# Patient Record
Sex: FEMALE | Race: OTHER | Employment: FULL TIME
[De-identification: names, ages, dates, MRNs, and addresses within clinical notes are randomized per-mention and may not be internally consistent; named-entity substitution may affect disease eponyms.]

---

## 2022-09-14 ENCOUNTER — OFFICE VISIT (OUTPATIENT)
Dept: INTERNAL MEDICINE CLINIC | Facility: CLINIC | Age: 37
End: 2022-09-14
Payer: COMMERCIAL

## 2022-09-14 VITALS
BODY MASS INDEX: 48.82 KG/M2 | SYSTOLIC BLOOD PRESSURE: 110 MMHG | OXYGEN SATURATION: 98 % | WEIGHT: 293 LBS | HEART RATE: 88 BPM | DIASTOLIC BLOOD PRESSURE: 78 MMHG | HEIGHT: 65 IN | RESPIRATION RATE: 20 BRPM

## 2022-09-14 DIAGNOSIS — Z01.89 ENCOUNTER FOR ROUTINE LABORATORY TESTING: ICD-10-CM

## 2022-09-14 DIAGNOSIS — Z11.9 SCREENING EXAMINATION FOR INFECTIOUS DISEASE: ICD-10-CM

## 2022-09-14 DIAGNOSIS — E66.01 MORBID OBESITY WITH BMI OF 70 AND OVER, ADULT (HCC): ICD-10-CM

## 2022-09-14 DIAGNOSIS — E66.01 MORBID OBESITY WITH BMI OF 70 AND OVER, ADULT (HCC): Primary | ICD-10-CM

## 2022-09-14 PROCEDURE — 99204 OFFICE O/P NEW MOD 45 MIN: CPT | Performed by: INTERNAL MEDICINE

## 2022-09-14 PROCEDURE — G8427 DOCREV CUR MEDS BY ELIG CLIN: HCPCS | Performed by: INTERNAL MEDICINE

## 2022-09-14 PROCEDURE — 1036F TOBACCO NON-USER: CPT | Performed by: INTERNAL MEDICINE

## 2022-09-14 PROCEDURE — G8417 CALC BMI ABV UP PARAM F/U: HCPCS | Performed by: INTERNAL MEDICINE

## 2022-09-14 RX ORDER — PHENTERMINE HYDROCHLORIDE 37.5 MG/1
37.5 CAPSULE ORAL EVERY MORNING
Qty: 30 CAPSULE | Refills: 0 | Status: SHIPPED | OUTPATIENT
Start: 2022-09-14 | End: 2022-10-12 | Stop reason: SDUPTHER

## 2022-09-14 ASSESSMENT — PATIENT HEALTH QUESTIONNAIRE - PHQ9
2. FEELING DOWN, DEPRESSED OR HOPELESS: 0
SUM OF ALL RESPONSES TO PHQ QUESTIONS 1-9: 0
SUM OF ALL RESPONSES TO PHQ9 QUESTIONS 1 & 2: 0
1. LITTLE INTEREST OR PLEASURE IN DOING THINGS: 0

## 2022-09-14 ASSESSMENT — ANXIETY QUESTIONNAIRES
GAD7 TOTAL SCORE: 0
5. BEING SO RESTLESS THAT IT IS HARD TO SIT STILL: 0
1. FEELING NERVOUS, ANXIOUS, OR ON EDGE: 0
3. WORRYING TOO MUCH ABOUT DIFFERENT THINGS: 0
7. FEELING AFRAID AS IF SOMETHING AWFUL MIGHT HAPPEN: 0
2. NOT BEING ABLE TO STOP OR CONTROL WORRYING: 0
4. TROUBLE RELAXING: 0
6. BECOMING EASILY ANNOYED OR IRRITABLE: 0

## 2022-09-14 ASSESSMENT — ENCOUNTER SYMPTOMS
CONSTIPATION: 0
SHORTNESS OF BREATH: 0
CHEST TIGHTNESS: 0
COUGH: 0
ABDOMINAL PAIN: 0
ABDOMINAL DISTENTION: 0

## 2022-09-14 NOTE — PROGRESS NOTES
Chief Complaint   Patient presents with    Establish Care     Weight loss medication. Belle Moreland is a 39 y.o. female who presents today for Establish Care (Weight loss medication. )  She is here to [facility] care, has not been seen by primary care for over 10 years, moved 2 years ago from [place], she lives with her wife, she has 3 kids 13 15and 5years old. 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 \  No significant past medical history but morbid obesity, she was previously following with a weight management program, where she was given medications like metformin, and phentermine, while she was taking the medication she lost more than 60 to 80 pounds in 6 months, she has been trying to walk, but is becoming difficult for her to exercise due to bilateral knee pain, she reports her lowest weight was around 260, and now she is at her highest point at 480, she is not quite ready for bariatric evaluation, and she would like to try medications again. She reports some dietary indiscretions, including pizza, sodas, Dr. Ladi Echevarria, but she believes her diet mainly consist in proteins, and could do better with fruits and vegetables    She has tried multiple diets, including low carb, high fat, high protein diet without significant improvement      Wt Readings from Last 3 Encounters:   09/14/22 (!) 480 lb 3.2 oz (217.8 kg)     Vitals:    09/14/22 1118   BP: 110/78   Site: Left Upper Arm   Position: Sitting   Pulse: 88   Resp: 20   SpO2: 98%   Weight: (!) 480 lb 3.2 oz (217.8 kg)   Height: 5' 4.5\" (1.638 m)        Assessment and plan:  1. Morbid obesity with BMI of 70 and over, adult [facility])  Assessment & Plan:  Discussed with patient that having obesity increases a person's risk of developing many health problems.    Diabetes, High blood pressure, High cholesterol, Heart disease (including heart attacks), Stroke, Sleep apnea (a disorder in which you stop breathing for short periods while asleep), Asthma, Cancer  But even if weight loss is not possible, may reduce risk by:  Become more active - Many types of physical activity can help, including walking. You can start with a few minutes a day and add more as you get stronger and build up your endurance. Improve your diet - It is healthy to have regular meal times, eat smaller portions, and not skip meals. Avoid sweets and processed foods, and instead eat more vegetables and fruits. Discussed with patient   About  Weight  Loss  Recommendations and  That  medications are Used on:   Chronic weight management, as an adjunct to a reduced-calorie diet and increased physical activity, in patients with either an initial body mass index (BMI) of =30 kg/m2 or an initial BMI of =27 kg/m2 and at least one weight-related comorbid condition (eg, hypertension, dyslipidemia, type 2 diabetes)  evaluate response by week 12; if patient has not lost =5% of baseline body weight, discontinue therapy    Orders:  -     CBC with Auto Differential; Future  -     Comprehensive Metabolic Panel; Future  -     Lipid Panel; Future  -     TSH; Future  -     phentermine 37.5 MG capsule; Take 1 capsule by mouth every morning for 30 days. , Disp-30 capsule, R-0Normal  2. Encounter for routine laboratory testing  -     CBC with Auto Differential; Future  -     Comprehensive Metabolic Panel; Future  -     Lipid Panel; Future  -     TSH; Future  -     Hepatitis C Antibody; Future  3. Screening examination for infectious disease  -     Hepatitis C Antibody; Future    Return in about 4 weeks (around 10/12/2022) for obesity. Review of system:    Review of Systems   Constitutional:  Negative for activity change, chills, fatigue and fever. Respiratory:  Negative for cough, chest tightness and shortness of breath (with exertion). Cardiovascular:  Negative for chest pain.    Gastrointestinal:  Negative for abdominal distention, abdominal Component Value Date    WBC 7.1 09/14/2022    HGB 13.4 09/14/2022    HCT 44.8 09/14/2022    MCV 91.6 09/14/2022     09/14/2022             This document was generated with the aid of voice recognition software. . Please be aware that there may be inadvertent transcription errors not identified and corrected by the [employer]

## 2022-09-14 NOTE — ASSESSMENT & PLAN NOTE
Discussed with patient that having obesity increases a person's risk of developing many health problems. Diabetes, High blood pressure, High cholesterol, Heart disease (including heart attacks), Stroke, Sleep apnea (a disorder in which you stop breathing for short periods while asleep), Asthma, Cancer  But even if weight loss is not possible, may reduce risk by:  Become more active - Many types of physical activity can help, including walking. You can start with a few minutes a day and add more as you get stronger and build up your endurance. Improve your diet - It is healthy to have regular meal times, eat smaller portions, and not skip meals. Avoid sweets and processed foods, and instead eat more vegetables and fruits.     Discussed with patient   About  Weight  Loss  Recommendations and  That  medications are Used on:   Chronic weight management, as an adjunct to a reduced-calorie diet and increased physical activity, in patients with either an initial body mass index (BMI) of =30 kg/m2 or an initial BMI of =27 kg/m2 and at least one weight-related comorbid condition (eg, hypertension, dyslipidemia, type 2 diabetes)  evaluate response by week 12; if patient has not lost =5% of baseline body weight, discontinue therapy

## 2022-09-15 LAB
BASOPHILS # BLD: 0.1 K/UL (ref 0–0.2)
BASOPHILS NFR BLD: 1 % (ref 0–2)
DIFFERENTIAL METHOD BLD: ABNORMAL
EOSINOPHIL # BLD: 0.2 K/UL (ref 0–0.8)
EOSINOPHIL NFR BLD: 2 % (ref 0.5–7.8)
ERYTHROCYTE [DISTWIDTH] IN BLOOD BY AUTOMATED COUNT: 13.6 % (ref 11.9–14.6)
HCT VFR BLD AUTO: 44.8 % (ref 35.8–46.3)
HCV AB SER QL: NONREACTIVE
HGB BLD-MCNC: 13.4 G/DL (ref 11.7–15.4)
HIV 1+2 AB+HIV1 P24 AG SERPL QL IA: NONREACTIVE
HIV 1/2 RESULT COMMENT: NORMAL
IMM GRANULOCYTES # BLD AUTO: 0 K/UL (ref 0–0.5)
IMM GRANULOCYTES NFR BLD AUTO: 1 % (ref 0–5)
LYMPHOCYTES # BLD: 2.8 K/UL (ref 0.5–4.6)
LYMPHOCYTES NFR BLD: 39 % (ref 13–44)
MCH RBC QN AUTO: 27.4 PG (ref 26.1–32.9)
MCHC RBC AUTO-ENTMCNC: 29.9 G/DL (ref 31.4–35)
MCV RBC AUTO: 91.6 FL (ref 79.6–97.8)
MONOCYTES # BLD: 0.5 K/UL (ref 0.1–1.3)
MONOCYTES NFR BLD: 8 % (ref 4–12)
NEUTS SEG # BLD: 3.5 K/UL (ref 1.7–8.2)
NEUTS SEG NFR BLD: 50 % (ref 43–78)
NRBC # BLD: 0 K/UL (ref 0–0.2)
PLATELET # BLD AUTO: 287 K/UL (ref 150–450)
PMV BLD AUTO: 10.4 FL (ref 9.4–12.3)
RBC # BLD AUTO: 4.89 M/UL (ref 4.05–5.2)
WBC # BLD AUTO: 7.1 K/UL (ref 4.3–11.1)

## 2022-09-17 LAB
ALBUMIN SERPL-MCNC: 3.9 G/DL (ref 3.5–5)
ALBUMIN/GLOB SERPL: 1.3 {RATIO} (ref 1.2–3.5)
ALP SERPL-CCNC: 64 U/L (ref 50–136)
ALT SERPL-CCNC: 67 U/L (ref 12–65)
ANION GAP SERPL CALC-SCNC: 8 MMOL/L (ref 4–13)
AST SERPL-CCNC: 42 U/L (ref 15–37)
BILIRUB SERPL-MCNC: 0.6 MG/DL (ref 0.2–1.1)
BUN SERPL-MCNC: 12 MG/DL (ref 6–23)
CALCIUM SERPL-MCNC: 9.7 MG/DL (ref 8.3–10.4)
CHLORIDE SERPL-SCNC: 106 MMOL/L (ref 101–110)
CHOLEST SERPL-MCNC: 180 MG/DL
CO2 SERPL-SCNC: 25 MMOL/L (ref 21–32)
CREAT SERPL-MCNC: 0.9 MG/DL (ref 0.6–1)
GLOBULIN SER CALC-MCNC: 3 G/DL (ref 2.3–3.5)
GLUCOSE SERPL-MCNC: 86 MG/DL (ref 65–100)
HDLC SERPL-MCNC: 67 MG/DL (ref 40–60)
HDLC SERPL: 2.7 {RATIO}
LDLC SERPL CALC-MCNC: 90.8 MG/DL
POTASSIUM SERPL-SCNC: 4.1 MMOL/L (ref 3.5–5.1)
PROT SERPL-MCNC: 6.9 G/DL (ref 6.3–8.2)
SODIUM SERPL-SCNC: 139 MMOL/L (ref 136–145)
TRIGL SERPL-MCNC: 111 MG/DL (ref 35–150)
TSH, 3RD GENERATION: 1.6 UIU/ML (ref 0.36–3.74)
VLDLC SERPL CALC-MCNC: 22.2 MG/DL (ref 6–23)

## 2022-09-26 NOTE — RESULT ENCOUNTER NOTE
I called pt to see if she has seen her lab results and read 's note. Pt said yes, she had seen her lab and read the note.

## 2022-10-12 ENCOUNTER — OFFICE VISIT (OUTPATIENT)
Dept: INTERNAL MEDICINE CLINIC | Facility: CLINIC | Age: 37
End: 2022-10-12
Payer: COMMERCIAL

## 2022-10-12 VITALS
HEART RATE: 102 BPM | WEIGHT: 293 LBS | BODY MASS INDEX: 48.82 KG/M2 | SYSTOLIC BLOOD PRESSURE: 110 MMHG | HEIGHT: 65 IN | DIASTOLIC BLOOD PRESSURE: 64 MMHG | OXYGEN SATURATION: 97 %

## 2022-10-12 DIAGNOSIS — E66.01 MORBID OBESITY WITH BMI OF 70 AND OVER, ADULT (HCC): ICD-10-CM

## 2022-10-12 PROCEDURE — 99214 OFFICE O/P EST MOD 30 MIN: CPT | Performed by: INTERNAL MEDICINE

## 2022-10-12 RX ORDER — METFORMIN HYDROCHLORIDE 500 MG/1
500 TABLET, EXTENDED RELEASE ORAL
Qty: 90 TABLET | Refills: 0 | Status: SHIPPED | OUTPATIENT
Start: 2022-10-12

## 2022-10-12 RX ORDER — PHENTERMINE HYDROCHLORIDE 37.5 MG/1
37.5 CAPSULE ORAL EVERY MORNING
Qty: 30 CAPSULE | Refills: 2 | Status: SHIPPED | OUTPATIENT
Start: 2022-10-12 | End: 2022-11-11

## 2022-10-12 ASSESSMENT — ENCOUNTER SYMPTOMS
ABDOMINAL DISTENTION: 0
SHORTNESS OF BREATH: 0
CHEST TIGHTNESS: 0
COUGH: 0
CONSTIPATION: 0
ABDOMINAL PAIN: 0

## 2022-10-12 NOTE — ASSESSMENT & PLAN NOTE
Congratulated for her efforts , has been taking phentermine for less than a month, has been able to lose 17 pounds, she wants to start metformin and continue with current plan.   We will follow-up again in 3 months,

## 2022-10-12 NOTE — PROGRESS NOTES
Chief Complaint   Patient presents with    Follow-up     ? Sending in Metformin. Refill Phentermine. Anand Mccullough is a 44 y.o. female who presents today for Follow-up (? Sending in Metformin./Refill Phentermine.)  Here to follow-up in density, since last visit she completed her labs, showing slight increase in liver enzymes, otherwise normal.  She has been tolerating phentermine 37.5, without side effects, but insomnia, she is taking melatonin to help with symptoms, but sometimes it may take her to hours to fall asleep. In regards her lifestyle modification, she started decreasing sodas, is not drinking Red Bull, and has been decreasing carbohydrate intake, despite of her knee pain, she has been trying to engage in physical activity, and is considering trying the [facility] for water exercises    Wt Readings from Last 3 Encounters:   10/12/22 (!) 463 lb 12.8 oz (210.4 kg)   09/14/22 (!) 480 lb 3.2 oz (217.8 kg)     Vitals:    10/12/22 0948   BP: 110/64   Site: Right Upper Arm   Position: Sitting   Pulse: (!) 102   SpO2: 97%   Weight: (!) 463 lb 12.8 oz (210.4 kg)   Height: 5' 4.5\" (1.638 m)        Assessment and plan:  1. Morbid obesity with BMI of 70 and over, adult [facility])  Assessment & Plan:   Congratulated for her efforts , has been taking phentermine for less than a month, has been able to lose 17 pounds, she wants to start metformin and continue with current plan. We will follow-up again in 3 months,  Orders:  -     phentermine 37.5 MG capsule; Take 1 capsule by mouth every morning for 30 days. , Disp-30 capsule, R-2Normal  -     metFORMIN (GLUCOPHAGE-XR) 500 MG extended release tablet; Take 1 tablet by mouth daily (with breakfast), Disp-90 tablet, R-0Normal      No follow-ups on file. Review of system:    Review of Systems   Constitutional:  Negative for activity change, chills, fatigue and fever. Respiratory:  Negative for cough, chest tightness and shortness of breath.     Cardiovascular: Negative for chest pain. Gastrointestinal:  Negative for abdominal distention, abdominal pain and constipation. Musculoskeletal:  Positive for arthralgias. Neurological:  Negative for dizziness and headaches. Psychiatric/Behavioral:  Positive for sleep disturbance. The patient is not nervous/anxious. Immunization history:    Immunization History   Administered Date(s) Administered    COVID-19, MODERNA BLUE border, Primary or Immunocompromised, (age 12y+), IM, 100 mcg/0.5mL 2021, 2021       Current medications:      Current Outpatient Medications:     phentermine 37.5 MG capsule, Take 1 capsule by mouth every morning for 30 days. , Disp: 30 capsule, Rfl: 2    metFORMIN (GLUCOPHAGE-XR) 500 MG extended release tablet, Take 1 tablet by mouth daily (with breakfast), Disp: 90 tablet, Rfl: 0      Family history:    Family History   Problem Relation Age of Onset    Hypothyroidism Mother     Lung Cancer Father     Heart Attack Father     Diabetes type 2  Father         Past medical history:    No past medical history on file. Past Surgical History:   Procedure Laterality Date     SECTION, CLASSIC      X3    TONSILLECTOMY AND ADENOIDECTOMY          Physical exam:    /64 (Site: Right Upper Arm, Position: Sitting)   Pulse (!) 102   Ht 5' 4.5\" (1.638 m)   Wt (!) 463 lb 12.8 oz (210.4 kg)   SpO2 97%   BMI 78.38 kg/m²     Physical Exam  Vitals reviewed. Constitutional:       Appearance: Normal appearance. She is obese. HENT:      Head: Normocephalic and atraumatic. Cardiovascular:      Rate and Rhythm: Normal rate. Pulmonary:      Effort: Pulmonary effort is normal.   Abdominal:      Palpations: Abdomen is soft. Neurological:      General: No focal deficit present. Mental Status: She is alert and oriented to person, place, and time.    Psychiatric:         Mood and Affect: Mood normal.         Behavior: Behavior normal.        Recent labs:    Lab Results   Component

## 2022-12-16 ENCOUNTER — OFFICE VISIT (OUTPATIENT)
Dept: INTERNAL MEDICINE CLINIC | Facility: CLINIC | Age: 37
End: 2022-12-16
Payer: COMMERCIAL

## 2022-12-16 ENCOUNTER — PATIENT MESSAGE (OUTPATIENT)
Dept: INTERNAL MEDICINE CLINIC | Facility: CLINIC | Age: 37
End: 2022-12-16

## 2022-12-16 VITALS
DIASTOLIC BLOOD PRESSURE: 82 MMHG | WEIGHT: 293 LBS | SYSTOLIC BLOOD PRESSURE: 132 MMHG | HEIGHT: 65 IN | HEART RATE: 86 BPM | BODY MASS INDEX: 48.82 KG/M2 | OXYGEN SATURATION: 98 %

## 2022-12-16 DIAGNOSIS — G89.29 CHRONIC PAIN OF BOTH KNEES: Primary | ICD-10-CM

## 2022-12-16 DIAGNOSIS — M25.561 CHRONIC PAIN OF BOTH KNEES: Primary | ICD-10-CM

## 2022-12-16 DIAGNOSIS — M25.562 CHRONIC PAIN OF BOTH KNEES: Primary | ICD-10-CM

## 2022-12-16 DIAGNOSIS — E66.01 MORBID OBESITY WITH BMI OF 70 AND OVER, ADULT (HCC): ICD-10-CM

## 2022-12-16 PROCEDURE — 99214 OFFICE O/P EST MOD 30 MIN: CPT | Performed by: INTERNAL MEDICINE

## 2022-12-16 RX ORDER — PHENTERMINE HYDROCHLORIDE 37.5 MG/1
CAPSULE ORAL
Qty: 30 CAPSULE | Refills: 2 | Status: SHIPPED | OUTPATIENT
Start: 2022-12-16 | End: 2023-03-23

## 2022-12-16 RX ORDER — PHENTERMINE HYDROCHLORIDE 37.5 MG/1
CAPSULE ORAL
COMMUNITY
Start: 2022-11-17 | End: 2022-12-16 | Stop reason: SDUPTHER

## 2022-12-16 RX ORDER — METFORMIN HYDROCHLORIDE 500 MG/1
500 TABLET, EXTENDED RELEASE ORAL
Qty: 90 TABLET | Refills: 0 | Status: SHIPPED | OUTPATIENT
Start: 2022-12-16

## 2022-12-16 ASSESSMENT — ENCOUNTER SYMPTOMS
CONSTIPATION: 0
CHEST TIGHTNESS: 0
ABDOMINAL DISTENTION: 0
COUGH: 0
ABDOMINAL PAIN: 0
SHORTNESS OF BREATH: 0

## 2022-12-16 NOTE — ASSESSMENT & PLAN NOTE
Congratulated for her efforts, she has been able to lose at least 33 pounds per records. Met with her measures from home, she has lost more than 50 pounds,  She will continue lifestyle modification, dietary changes, will place referral to Ortho for knee pain management, to see if we can increase her exercise tolerance.

## 2022-12-16 NOTE — PROGRESS NOTES
Chief Complaint   Patient presents with    Follow-up     2 month f/u. Gogo Allison is a 44 y.o. female who presents today for Follow-up (2 month f/u. )     She is here for follow-up. Obesity, since last visit we decided metformin, and has been compliant with metformin and phentermine. Since starting the medication she has been able to lose at least 50 pounds, but this office has been at least 34 pounds, she reports the medications has been helping with appetite suppression, has been able to decrease the amount of carbs that she was consuming, has not been frying food, but nerve-racking. She is no longer drinking caffeinated drinks, or sodas, she feels motivated, has been walking more, but unfortunately due to her chronic knee pain has not been able to engage in more physical activity, and now that she has been using it more like to see the orthopedic provider, she has been dealing with chronic bilateral knee pain    Wt Readings from Last 3 Encounters:   12/16/22 (!) 447 lb 6.4 oz (202.9 kg)   10/12/22 (!) 463 lb 12.8 oz (210.4 kg)   09/14/22 (!) 480 lb 3.2 oz (217.8 kg)     Vitals:    12/16/22 0810   BP: 132/82   Site: Left Upper Arm   Position: Sitting   Pulse: 86   SpO2: 98%   Weight: (!) 447 lb 6.4 oz (202.9 kg)   Height: 5' 4.5\" (1.638 m)        Assessment and plan:  1. Chronic pain of both knees  -     [facility]  2. Morbid obesity with BMI of 70 and over, adult [facility])  Assessment & Plan:  Congratulated for her efforts, she has been able to lose at least 33 pounds per records. Met with her measures from home, she has lost more than 50 pounds,  She will continue lifestyle modification, dietary changes, will place referral to Ortho for knee pain management, to see if we can increase her exercise tolerance. Orders:  -     metFORMIN (GLUCOPHAGE-XR) 500 MG extended release tablet;  Take 1 tablet by mouth daily (with breakfast), Disp-90 tablet, R-0Normal  -     phentermine 37.5 MG capsule; TAKE 1 CAPSULE BY MOUTH EVERY DAY IN THE MORNING, Disp-30 capsule, R-2Normal      No follow-ups on file. Review of system:    Review of Systems   Constitutional:  Negative for activity change, chills, fatigue and fever. Respiratory:  Negative for cough, chest tightness and shortness of breath. Cardiovascular:  Negative for chest pain. Gastrointestinal:  Negative for abdominal distention, abdominal pain and constipation. Neurological:  Negative for dizziness and headaches. Psychiatric/Behavioral:  The patient is not nervous/anxious. Immunization history:    Immunization History   Administered Date(s) Administered    COVID-19, MODERNA BLUE border, Primary or Immunocompromised, (age 12y+), IM, 100 mcg/0.5mL 2021, 2021       Current medications:      Current Outpatient Medications:     metFORMIN (GLUCOPHAGE-XR) 500 MG extended release tablet, Take 1 tablet by mouth daily (with breakfast), Disp: 90 tablet, Rfl: 0    phentermine 37.5 MG capsule, TAKE 1 CAPSULE BY MOUTH EVERY DAY IN THE MORNING, Disp: 30 capsule, Rfl: 2      Family history:    Family History   Problem Relation Age of Onset    Hypothyroidism Mother     Lung Cancer Father     Heart Attack Father     Diabetes type 2  Father         Past medical history:    History reviewed. No pertinent past medical history. Past Surgical History:   Procedure Laterality Date     SECTION, CLASSIC      X3    TONSILLECTOMY AND ADENOIDECTOMY          Physical exam:    /82 (Site: Left Upper Arm, Position: Sitting)   Pulse 86   Ht 5' 4.5\" (1.638 m)   Wt (!) 447 lb 6.4 oz (202.9 kg)   SpO2 98%   BMI 75.61 kg/m²     Physical Exam  Vitals reviewed. Constitutional:       Appearance: Normal appearance. She is obese. HENT:      Head: Normocephalic and atraumatic. Cardiovascular:      Rate and Rhythm: Normal rate and regular rhythm.    Pulmonary:      Effort: Pulmonary effort is normal.      Breath sounds: Normal breath sounds. Abdominal:      Palpations: Abdomen is soft. Neurological:      General: No focal deficit present. Mental Status: She is alert and oriented to person, place, and time. Gait: Gait abnormal.   Psychiatric:         Mood and Affect: Mood normal.         Behavior: Behavior normal.        Recent labs:    Lab Results   Component Value Date    CHOL 180 09/14/2022     Lab Results   Component Value Date    TRIG 111 09/14/2022     Lab Results   Component Value Date    HDL 67 (H) 09/14/2022     Lab Results   Component Value Date    LDLCALC 90.8 09/14/2022     Lab Results   Component Value Date    LABVLDL 22.2 09/14/2022     Lab Results   Component Value Date    CHOLHDLRATIO 2.7 09/14/2022     Lab Results   Component Value Date     09/14/2022    K 4.1 09/14/2022     09/14/2022    CO2 25 09/14/2022    BUN 12 09/14/2022    CREATININE 0.90 09/14/2022    GLUCOSE 86 09/14/2022    CALCIUM 9.7 09/14/2022    PROT 6.9 09/14/2022    LABALBU 3.9 09/14/2022    BILITOT 0.6 09/14/2022    ALKPHOS 64 09/14/2022    AST 42 (H) 09/14/2022    ALT 67 (H) 09/14/2022    LABGLOM >60 09/14/2022    GFRAA >60 09/14/2022    GLOB 3.0 09/14/2022     Lab Results   Component Value Date    WBC 7.1 09/14/2022    HGB 13.4 09/14/2022    HCT 44.8 09/14/2022    MCV 91.6 09/14/2022     09/14/2022             This document was generated with the aid of voice recognition software. . Please be aware that there may be inadvertent transcription errors not identified and corrected by the [employer]

## 2023-01-09 ENCOUNTER — OFFICE VISIT (OUTPATIENT)
Dept: ORTHOPEDIC SURGERY | Age: 38
End: 2023-01-09
Payer: COMMERCIAL

## 2023-01-09 DIAGNOSIS — M17.11 PRIMARY OSTEOARTHRITIS OF RIGHT KNEE: Primary | ICD-10-CM

## 2023-01-09 DIAGNOSIS — M25.561 PAIN IN BOTH KNEES, UNSPECIFIED CHRONICITY: ICD-10-CM

## 2023-01-09 DIAGNOSIS — E66.01 MORBID OBESITY WITH BMI OF 70 AND OVER, ADULT (HCC): ICD-10-CM

## 2023-01-09 DIAGNOSIS — M17.12 PRIMARY OSTEOARTHRITIS OF LEFT KNEE: ICD-10-CM

## 2023-01-09 DIAGNOSIS — M25.562 PAIN IN BOTH KNEES, UNSPECIFIED CHRONICITY: ICD-10-CM

## 2023-01-09 PROCEDURE — 20610 DRAIN/INJ JOINT/BURSA W/O US: CPT | Performed by: ORTHOPAEDIC SURGERY

## 2023-01-09 PROCEDURE — 99204 OFFICE O/P NEW MOD 45 MIN: CPT | Performed by: ORTHOPAEDIC SURGERY

## 2023-01-09 RX ORDER — TRIAMCINOLONE ACETONIDE 40 MG/ML
80 INJECTION, SUSPENSION INTRA-ARTICULAR; INTRAMUSCULAR ONCE
Status: COMPLETED | OUTPATIENT
Start: 2023-01-09 | End: 2023-01-09

## 2023-01-09 RX ADMIN — TRIAMCINOLONE ACETONIDE 80 MG: 40 INJECTION, SUSPENSION INTRA-ARTICULAR; INTRAMUSCULAR at 15:19

## 2023-01-31 DIAGNOSIS — M17.11 PRIMARY OSTEOARTHRITIS OF RIGHT KNEE: Primary | ICD-10-CM

## 2023-01-31 DIAGNOSIS — M17.12 PRIMARY OSTEOARTHRITIS OF LEFT KNEE: ICD-10-CM

## 2023-02-27 ENCOUNTER — OFFICE VISIT (OUTPATIENT)
Dept: ORTHOPEDIC SURGERY | Age: 38
End: 2023-02-27

## 2023-02-27 DIAGNOSIS — M17.12 PRIMARY OSTEOARTHRITIS OF LEFT KNEE: ICD-10-CM

## 2023-02-27 DIAGNOSIS — M17.11 PRIMARY OSTEOARTHRITIS OF RIGHT KNEE: Primary | ICD-10-CM

## 2023-02-27 RX ORDER — HYALURONATE SODIUM 10 MG/ML
20 SYRINGE (ML) INTRAARTICULAR ONCE
Status: COMPLETED | OUTPATIENT
Start: 2023-02-27 | End: 2023-02-27

## 2023-02-27 RX ADMIN — Medication 20 MG: at 14:47

## 2023-02-27 RX ADMIN — Medication 20 MG: at 14:42

## 2023-02-27 NOTE — PROGRESS NOTES
Name: Mariah Gonzalez  YOB: 1985  Gender: female  MRN: [id number]      CC: Follow-up (Bilateral knee Euflexxa #1)       HPI: Mariah Gonzalez is a 44 y.o. female who presents with Follow-up (Bilateral knee Euflexxa #1)  . Physical Examination:  General: no acute distress  bilateral Knee: Exam is unchanged, the knee continues to be painful with palpation and range of motion. There is no effusion or concern for infection. Assessment:   1. Primary osteoarthritis of right knee    2. Primary osteoarthritis of left knee         Plan:     Bilateral knee injected from a anterior lateral approach with 2 mL Euflexxa viscosupplementation after sterile prep. Patient tolerated the procedure well was given postinjection flare precautions. Follow up 1 week        Peng Esipnal MD, [address] [place] [address] and Sports Medicine

## 2023-03-06 ENCOUNTER — OFFICE VISIT (OUTPATIENT)
Dept: ORTHOPEDIC SURGERY | Age: 38
End: 2023-03-06

## 2023-03-06 DIAGNOSIS — M17.12 PRIMARY OSTEOARTHRITIS OF LEFT KNEE: ICD-10-CM

## 2023-03-06 DIAGNOSIS — M17.11 PRIMARY OSTEOARTHRITIS OF RIGHT KNEE: Primary | ICD-10-CM

## 2023-03-06 RX ORDER — HYALURONATE SODIUM 10 MG/ML
20 SYRINGE (ML) INTRAARTICULAR ONCE
Status: COMPLETED | OUTPATIENT
Start: 2023-03-06 | End: 2023-03-06

## 2023-03-06 RX ADMIN — Medication 20 MG: at 15:10

## 2023-03-06 NOTE — PROGRESS NOTES
Name: Karon Marie  YOB: 1985  Gender: female  MRN: [id number]      CC: Follow-up (Bilateral knee Euflexxa #2)       HPI: Karon Marie is a 44 y.o. female who presents with Follow-up (Bilateral knee Euflexxa #2)  . She has been noticing improvement in her knees. Physical Examination:  General: no acute distress  bilateral Knee: Exam is unchanged, the knee continues to be painful with palpation and range of motion. There is no effusion or concern for infection. Assessment:   1. Primary osteoarthritis of right knee    2. Primary osteoarthritis of left knee         Plan:     Bilateral knee injected from a anterior lateral approach with 2 mL Euflexxa viscosupplementation after sterile prep. Patient tolerated the procedure well was given postinjection flare precautions. Follow up 1 week        Peng Pagan MD, [address] [place] [address] Sports Medicine

## 2023-03-13 ENCOUNTER — OFFICE VISIT (OUTPATIENT)
Dept: ORTHOPEDIC SURGERY | Age: 38
End: 2023-03-13
Payer: COMMERCIAL

## 2023-03-13 DIAGNOSIS — M17.12 PRIMARY OSTEOARTHRITIS OF LEFT KNEE: ICD-10-CM

## 2023-03-13 DIAGNOSIS — M17.11 PRIMARY OSTEOARTHRITIS OF RIGHT KNEE: Primary | ICD-10-CM

## 2023-03-13 PROCEDURE — 20610 DRAIN/INJ JOINT/BURSA W/O US: CPT | Performed by: ORTHOPAEDIC SURGERY

## 2023-03-13 RX ORDER — HYALURONATE SODIUM 10 MG/ML
20 SYRINGE (ML) INTRAARTICULAR ONCE
Status: COMPLETED | OUTPATIENT
Start: 2023-03-13 | End: 2023-03-13

## 2023-03-13 RX ADMIN — Medication 20 MG: at 14:36

## 2023-03-13 RX ADMIN — Medication 20 MG: at 14:35

## 2023-03-13 NOTE — PROGRESS NOTES
Name: Harjit Whyte  YOB: 1985  Gender: female  MRN: [id number]      CC: Knee Pain (B Knee)       HPI: Harjit Whyte is a 44 y.o. female who presents with Knee Pain (B Knee)  . Physical Examination:  General: no acute distress  bilateral Knee: Exam is unchanged, the knee continues to be painful with palpation and range of motion. There is no effusion or concern for infection. Assessment:   1. Primary osteoarthritis of right knee    2. Primary osteoarthritis of left knee         Plan:     Bilateral knee injected from a anterior lateral approach with 2 mL Euflexxa viscosupplementation after sterile prep. Patient tolerated the procedure well was given postinjection flare precautions. Unfortunately she is not seeing great pain relief hip to this point. We discussed that she may not get complete relief of her symptoms. She is not unfortunately a surgical candidate and her body habitus and age. We may consider referral to pain management for consideration of RFA as a next step if she has continued pain    Follow up as needed        Peng Minor MD, [address] [place] [address] and Sports Medicine

## 2023-03-16 ENCOUNTER — OFFICE VISIT (OUTPATIENT)
Dept: INTERNAL MEDICINE CLINIC | Facility: CLINIC | Age: 38
End: 2023-03-16
Payer: COMMERCIAL

## 2023-03-16 VITALS
DIASTOLIC BLOOD PRESSURE: 90 MMHG | SYSTOLIC BLOOD PRESSURE: 150 MMHG | HEIGHT: 65 IN | WEIGHT: 293 LBS | BODY MASS INDEX: 48.82 KG/M2

## 2023-03-16 DIAGNOSIS — Z23 ENCOUNTER FOR VACCINATION: ICD-10-CM

## 2023-03-16 DIAGNOSIS — Z01.419 ENCOUNTER FOR GYNECOLOGICAL EXAMINATION WITHOUT ABNORMAL FINDING: ICD-10-CM

## 2023-03-16 DIAGNOSIS — E66.01 MORBID OBESITY WITH BMI OF 70 AND OVER, ADULT (HCC): Primary | ICD-10-CM

## 2023-03-16 DIAGNOSIS — R03.0 ELEVATED BLOOD PRESSURE READING: ICD-10-CM

## 2023-03-16 PROCEDURE — 99214 OFFICE O/P EST MOD 30 MIN: CPT | Performed by: INTERNAL MEDICINE

## 2023-03-16 PROCEDURE — 90471 IMMUNIZATION ADMIN: CPT | Performed by: INTERNAL MEDICINE

## 2023-03-16 PROCEDURE — 90715 TDAP VACCINE 7 YRS/> IM: CPT | Performed by: INTERNAL MEDICINE

## 2023-03-16 RX ORDER — METFORMIN HYDROCHLORIDE 500 MG/1
500 TABLET, EXTENDED RELEASE ORAL
Qty: 90 TABLET | Refills: 1 | Status: SHIPPED | OUTPATIENT
Start: 2023-03-16

## 2023-03-16 RX ORDER — PHENTERMINE HYDROCHLORIDE 37.5 MG/1
CAPSULE ORAL
Qty: 30 CAPSULE | Refills: 2 | Status: SHIPPED | OUTPATIENT
Start: 2023-03-16 | End: 2023-06-21

## 2023-03-16 SDOH — ECONOMIC STABILITY: INCOME INSECURITY: HOW HARD IS IT FOR YOU TO PAY FOR THE VERY BASICS LIKE FOOD, HOUSING, MEDICAL CARE, AND HEATING?: NOT HARD AT ALL

## 2023-03-16 SDOH — ECONOMIC STABILITY: FOOD INSECURITY: WITHIN THE PAST 12 MONTHS, THE FOOD YOU BOUGHT JUST DIDN'T LAST AND YOU DIDN'T HAVE MONEY TO GET MORE.: NEVER TRUE

## 2023-03-16 SDOH — ECONOMIC STABILITY: HOUSING INSECURITY
IN THE LAST 12 MONTHS, WAS THERE A TIME WHEN YOU DID NOT HAVE A STEADY PLACE TO SLEEP OR SLEPT IN A SHELTER (INCLUDING NOW)?: NO

## 2023-03-16 SDOH — ECONOMIC STABILITY: FOOD INSECURITY: WITHIN THE PAST 12 MONTHS, YOU WORRIED THAT YOUR FOOD WOULD RUN OUT BEFORE YOU GOT MONEY TO BUY MORE.: NEVER TRUE

## 2023-03-16 ASSESSMENT — PATIENT HEALTH QUESTIONNAIRE - PHQ9
1. LITTLE INTEREST OR PLEASURE IN DOING THINGS: 0
2. FEELING DOWN, DEPRESSED OR HOPELESS: 0
SUM OF ALL RESPONSES TO PHQ QUESTIONS 1-9: 0
SUM OF ALL RESPONSES TO PHQ9 QUESTIONS 1 & 2: 0
SUM OF ALL RESPONSES TO PHQ QUESTIONS 1-9: 0

## 2023-03-16 ASSESSMENT — ENCOUNTER SYMPTOMS
CONSTIPATION: 0
ABDOMINAL DISTENTION: 0
COUGH: 0
SHORTNESS OF BREATH: 0
ABDOMINAL PAIN: 0
CHEST TIGHTNESS: 0

## 2023-03-16 NOTE — PROGRESS NOTES
Chief Complaint   Patient presents with    Pao Evans is a 44 y.o. female who presents today for Follow-up     She is here to follow-up in weight management, screening take metformin and phentermine, has been able to lose another 6 pounds since last visit, for a total of more than 40 pounds since establishing here. She is requesting referral for gynecology for routine GYN evaluations. She denies any side effects, can continue to make significant changes in her diet, she has been decreased carbs, having more cauliflower avoiding all sodas and caffeinated drinks. Blood pressure today is elevated, her blood pressure before has been normal, she believes this could be be associated with stressors with her ex-. She has established with orthopedics, has been getting corticosteroid injections and gel injection, she is now feeling a slightly better, but has not been exercising as she would like to. But is planning to restart her routine within the next few days    Wt Readings from Last 3 Encounters:   03/16/23 (!) 441 lb (200 kg)   12/16/22 (!) 447 lb 6.4 oz (202.9 kg)   10/12/22 (!) 463 lb 12.8 oz (210.4 kg)     Vitals:    03/16/23 0812   BP: (!) 150/90   Weight: (!) 441 lb (200 kg)   Height: 5' 5\" (1.651 m)        Assessment and plan:  1. Morbid obesity with BMI of 70 and over, adult [facility])  Assessment & Plan:   Doing well , well continue current meds  Will start exercise routine once better from her knees    Orders:  -     metFORMIN (GLUCOPHAGE-XR) 500 MG extended release tablet; Take 1 tablet by mouth daily (with breakfast), Disp-90 tablet, R-1Normal  -     phentermine 37.5 MG capsule; TAKE 1 CAPSULE BY MOUTH EVERY DAY IN THE MORNING, Disp-30 capsule, R-2Normal  2. Encounter for vaccination  -     Tdap, [address], (age 8 yrs+), IM  3. Encounter for gynecological examination without abnormal finding  -     [facility] - Colleen Underwood DO, Gynecology, [facility]  4.  Elevated blood pressure reading  Assessment & Plan:  Discussed elevated blood pressure possible long term effects , like cardiac, renal , cerebral vascular complication. possible therapeutic options like :    -Dietary salt restriction   -DASH diet - The Dietary Approaches to Stop Hypertension dietary pattern is high in vegetables, fruits, low-fat dairy products, whole grains, poultry, fish, and nuts and low in sweets, sugar-sweetened beverages, and red meats   -Aerobic Exercise.   -Limit alcohol and caffeine drinks    Return in about 6 weeks (around 4/27/2023) for HTN, obesity. Review of system:    Review of Systems   Constitutional:  Negative for activity change, chills, fatigue and fever. Respiratory:  Negative for cough, chest tightness and shortness of breath. Cardiovascular:  Negative for chest pain. Gastrointestinal:  Negative for abdominal distention, abdominal pain and constipation. Musculoskeletal:  Positive for arthralgias. Allergic/Immunologic: Positive for environmental allergies. Neurological:  Negative for dizziness and headaches. Psychiatric/Behavioral:  The patient is not nervous/anxious. Immunization history:    Immunization History   Administered Date(s) Administered    COVID-19, [facility], Primary or Immunocompromised, (age 12y+), IM, 100 mcg/0.5mL 06/25/2021, 07/23/2021    Tdap (Boostrix, Adacel) 03/16/2023       Current medications:      Current Outpatient Medications:     metFORMIN (GLUCOPHAGE-XR) 500 MG extended release tablet, Take 1 tablet by mouth daily (with breakfast), Disp: 90 tablet, Rfl: 1    phentermine 37.5 MG capsule, TAKE 1 CAPSULE BY MOUTH EVERY DAY IN THE MORNING, Disp: 30 capsule, Rfl: 2      Family history:    Family History   Problem Relation Age of Onset    Hypothyroidism Mother     Lung Cancer Father     Heart Attack Father     Diabetes type 2  Father         Past medical history:    History reviewed. No pertinent past medical history.      Past Surgical History:   Procedure Laterality Date     SECTION, CLASSIC      X3    TONSILLECTOMY AND ADENOIDECTOMY          Physical exam:    BP (!) 150/90   Ht 5' 5\" (1.651 m)   Wt (!) 441 lb (200 kg)   BMI 73.39 kg/m²     Physical Exam  Vitals reviewed. Constitutional:       Appearance: Normal appearance. She is obese. HENT:      Head: Normocephalic and atraumatic. Cardiovascular:      Rate and Rhythm: Normal rate and regular rhythm. Pulmonary:      Effort: Pulmonary effort is normal.      Breath sounds: Normal breath sounds. Abdominal:      Palpations: Abdomen is soft. Neurological:      General: No focal deficit present. Mental Status: She is alert and oriented to person, place, and time.    Psychiatric:         Mood and Affect: Mood normal.         Behavior: Behavior normal.        Recent labs:      Lab Results   Component Value Date    CHOL 180 2022     Lab Results   Component Value Date    TRIG 111 2022     Lab Results   Component Value Date    HDL 67 (H) 2022     Lab Results   Component Value Date    LDLCALC 90.8 2022     Lab Results   Component Value Date    LABVLDL 22.2 2022     Lab Results   Component Value Date    CHOLHDLRATIO 2.7 2022       Lab Results   Component Value Date    QYX7LNP 1.600 2022       Lab Results   Component Value Date     2022    K 4.1 2022     2022    CO2 25 2022    BUN 12 2022    CREATININE 0.90 2022    GLUCOSE 86 2022    CALCIUM 9.7 2022    PROT 6.9 2022    LABALBU 3.9 2022    BILITOT 0.6 2022    ALKPHOS 64 2022    AST 42 (H) 2022    ALT 67 (H) 2022    LABGLOM >60 2022    GFRAA >60 2022    GLOB 3.0 2022       Lab Results   Component Value Date    WBC 7.1 2022    HGB 13.4 2022    HCT 44.8 2022    MCV 91.6 2022     2022             This document was generated with the aid of voice recognition software. . Please be aware that there may be inadvertent transcription errors not identified and corrected by the [employer]

## 2023-03-16 NOTE — ASSESSMENT & PLAN NOTE
Discussed elevated blood pressure possible long term effects , like cardiac, renal , cerebral vascular complication.    possible therapeutic options like :    -Dietary salt restriction   -DASH diet - The Dietary Approaches to Stop Hypertension dietary pattern is high in vegetables, fruits, low-fat dairy products, whole grains, poultry, fish, and nuts and low in sweets, sugar-sweetened beverages, and red meats   -Aerobic Exercise.   -Limit alcohol and caffeine drinks

## 2023-04-27 ENCOUNTER — OFFICE VISIT (OUTPATIENT)
Dept: INTERNAL MEDICINE CLINIC | Facility: CLINIC | Age: 38
End: 2023-04-27
Payer: COMMERCIAL

## 2023-04-27 VITALS
SYSTOLIC BLOOD PRESSURE: 137 MMHG | BODY MASS INDEX: 48.82 KG/M2 | HEART RATE: 113 BPM | HEIGHT: 65 IN | DIASTOLIC BLOOD PRESSURE: 78 MMHG | WEIGHT: 293 LBS

## 2023-04-27 DIAGNOSIS — E66.01 MORBID OBESITY WITH BMI OF 70 AND OVER, ADULT (HCC): Primary | ICD-10-CM

## 2023-04-27 DIAGNOSIS — R03.0 ELEVATED BLOOD PRESSURE READING: ICD-10-CM

## 2023-04-27 PROCEDURE — 99214 OFFICE O/P EST MOD 30 MIN: CPT | Performed by: INTERNAL MEDICINE

## 2023-04-27 RX ORDER — METFORMIN HYDROCHLORIDE 500 MG/1
1000 TABLET, EXTENDED RELEASE ORAL
Qty: 180 TABLET | Refills: 1 | Status: SHIPPED | OUTPATIENT
Start: 2023-04-27

## 2023-04-27 ASSESSMENT — PATIENT HEALTH QUESTIONNAIRE - PHQ9
SUM OF ALL RESPONSES TO PHQ QUESTIONS 1-9: 0
SUM OF ALL RESPONSES TO PHQ9 QUESTIONS 1 & 2: 0
2. FEELING DOWN, DEPRESSED OR HOPELESS: 0
SUM OF ALL RESPONSES TO PHQ QUESTIONS 1-9: 0
SUM OF ALL RESPONSES TO PHQ QUESTIONS 1-9: 0
1. LITTLE INTEREST OR PLEASURE IN DOING THINGS: 0
SUM OF ALL RESPONSES TO PHQ QUESTIONS 1-9: 0

## 2023-04-27 ASSESSMENT — ENCOUNTER SYMPTOMS
ABDOMINAL DISTENTION: 0
CONSTIPATION: 0
ABDOMINAL PAIN: 0
SHORTNESS OF BREATH: 0
CHEST TIGHTNESS: 0
COUGH: 0

## 2023-04-27 NOTE — PROGRESS NOTES
Chief Complaint   Patient presents with    Blood Pressure Check     120-130/70-85 at home         Jesse Plascencia is a 44 y.o. female who presents today for Blood Pressure Check (120-130/70-85 at home )     She is here for follow-up of blood pressure, she has been taking her blood pressure at home, and he has been below 130/85. She denies any chest pain, shortness of breath,  Continue to lose weight, but unfortunately she feels this is slowing down. She continues to follow-up low-carb diet almost keto. Has been trying to walk at least 1-1/2 mile, has been doing exercises for her knee. And over she feels is getting better. Wt Readings from Last 3 Encounters:   04/27/23 (!) 438 lb (198.7 kg)   03/16/23 (!) 441 lb (200 kg)   12/16/22 (!) 447 lb 6.4 oz (202.9 kg)     Vitals:    04/27/23 0756   BP: 137/78   Site: Left Upper Arm   Position: Sitting   Pulse: (!) 113   Weight: (!) 438 lb (198.7 kg)   Height: 5' 5\" (1.651 m)        Assessment and plan:  1. Morbid obesity with BMI of 70 and over, adult [facility])  Assessment & Plan:   She continues to improve, will increase metformin to 1000 mg daily, she has been told to take it once a day, or split the dose to twice a day, tolerating therapy, denies any side effects we will continue to monitor every 3 months  Orders:  -     metFORMIN (GLUCOPHAGE-XR) 500 MG extended release tablet; Take 2 tablets by mouth daily (with breakfast), Disp-180 tablet, R-1Normal  2. Elevated blood pressure reading  Assessment & Plan:   At goal, lifestyle modifications recommended   No on meds      Return in about 3 months (around 7/27/2023) for weight managment. Review of system:    Review of Systems   Constitutional:  Negative for activity change, chills, fatigue and fever. Respiratory:  Negative for cough, chest tightness and shortness of breath. Cardiovascular:  Negative for chest pain. Gastrointestinal:  Negative for abdominal distention, abdominal pain and constipation.

## 2023-04-27 NOTE — ASSESSMENT & PLAN NOTE
She continues to improve, will increase metformin to 1000 mg daily, she has been told to take it once a day, or split the dose to twice a day, tolerating therapy, denies any side effects we will continue to monitor every 3 months

## 2023-06-27 DIAGNOSIS — E66.01 MORBID OBESITY WITH BMI OF 70 AND OVER, ADULT (HCC): ICD-10-CM

## 2023-06-27 RX ORDER — PHENTERMINE HYDROCHLORIDE 37.5 MG/1
37.5 TABLET ORAL
COMMUNITY
End: 2023-06-27 | Stop reason: CLARIF

## 2023-06-27 RX ORDER — PHENTERMINE HYDROCHLORIDE 37.5 MG/1
CAPSULE ORAL
Qty: 30 CAPSULE | Refills: 2 | Status: SHIPPED | OUTPATIENT
Start: 2023-06-27 | End: 2023-06-27

## 2023-06-27 RX ORDER — PHENTERMINE HYDROCHLORIDE 37.5 MG/1
37.5 CAPSULE ORAL EVERY MORNING
COMMUNITY
End: 2023-06-27 | Stop reason: ALTCHOICE

## 2023-06-27 NOTE — TELEPHONE ENCOUNTER
Requested Prescriptions     Pending Prescriptions Disp Refills    phentermine 37.5 MG capsule [Pharmacy Med Name: PHENTERMINE 37.5 MG CAPSULE] 30 capsule 2     Sig: TAKE 1 CAPSULE BY MOUTH EVERY DAY IN THE MORNING

## 2023-07-31 ASSESSMENT — PATIENT HEALTH QUESTIONNAIRE - PHQ9
SUM OF ALL RESPONSES TO PHQ QUESTIONS 1-9: 0
1. LITTLE INTEREST OR PLEASURE IN DOING THINGS: 0
SUM OF ALL RESPONSES TO PHQ QUESTIONS 1-9: 0
SUM OF ALL RESPONSES TO PHQ QUESTIONS 1-9: 0
2. FEELING DOWN, DEPRESSED OR HOPELESS: 0
SUM OF ALL RESPONSES TO PHQ9 QUESTIONS 1 & 2: 0
SUM OF ALL RESPONSES TO PHQ QUESTIONS 1-9: 0
1. LITTLE INTEREST OR PLEASURE IN DOING THINGS: NOT AT ALL
2. FEELING DOWN, DEPRESSED OR HOPELESS: NOT AT ALL
SUM OF ALL RESPONSES TO PHQ9 QUESTIONS 1 & 2: 0

## 2023-08-01 ENCOUNTER — OFFICE VISIT (OUTPATIENT)
Dept: INTERNAL MEDICINE CLINIC | Facility: CLINIC | Age: 38
End: 2023-08-01
Payer: COMMERCIAL

## 2023-08-01 VITALS
OXYGEN SATURATION: 98 % | HEART RATE: 95 BPM | DIASTOLIC BLOOD PRESSURE: 70 MMHG | BODY MASS INDEX: 48.82 KG/M2 | SYSTOLIC BLOOD PRESSURE: 110 MMHG | WEIGHT: 293 LBS | HEIGHT: 65 IN

## 2023-08-01 DIAGNOSIS — R03.0 ELEVATED BLOOD PRESSURE READING: ICD-10-CM

## 2023-08-01 DIAGNOSIS — E66.01 MORBID OBESITY WITH BMI OF 70 AND OVER, ADULT (HCC): ICD-10-CM

## 2023-08-01 DIAGNOSIS — E66.01 MORBID OBESITY WITH BMI OF 70 AND OVER, ADULT (HCC): Primary | ICD-10-CM

## 2023-08-01 LAB
ALBUMIN SERPL-MCNC: 3.6 G/DL (ref 3.5–5)
ALBUMIN/GLOB SERPL: 1 (ref 0.4–1.6)
ALP SERPL-CCNC: 53 U/L (ref 50–136)
ALT SERPL-CCNC: 54 U/L (ref 12–65)
ANION GAP SERPL CALC-SCNC: 8 MMOL/L (ref 2–11)
AST SERPL-CCNC: 35 U/L (ref 15–37)
BILIRUB SERPL-MCNC: 0.4 MG/DL (ref 0.2–1.1)
BUN SERPL-MCNC: 14 MG/DL (ref 6–23)
CALCIUM SERPL-MCNC: 9.4 MG/DL (ref 8.3–10.4)
CHLORIDE SERPL-SCNC: 111 MMOL/L (ref 101–110)
CHOLEST SERPL-MCNC: 189 MG/DL
CO2 SERPL-SCNC: 22 MMOL/L (ref 21–32)
CREAT SERPL-MCNC: 1.1 MG/DL (ref 0.6–1)
GLOBULIN SER CALC-MCNC: 3.5 G/DL (ref 2.8–4.5)
GLUCOSE SERPL-MCNC: 91 MG/DL (ref 65–100)
HDLC SERPL-MCNC: 66 MG/DL (ref 40–60)
HDLC SERPL: 2.9
LDLC SERPL CALC-MCNC: 91.4 MG/DL
POTASSIUM SERPL-SCNC: 4.2 MMOL/L (ref 3.5–5.1)
PROT SERPL-MCNC: 7.1 G/DL (ref 6.3–8.2)
SODIUM SERPL-SCNC: 141 MMOL/L (ref 133–143)
TRIGL SERPL-MCNC: 158 MG/DL (ref 35–150)
VLDLC SERPL CALC-MCNC: 31.6 MG/DL (ref 6–23)

## 2023-08-01 PROCEDURE — 99214 OFFICE O/P EST MOD 30 MIN: CPT | Performed by: INTERNAL MEDICINE

## 2023-08-01 RX ORDER — SEMAGLUTIDE 0.68 MG/ML
INJECTION, SOLUTION SUBCUTANEOUS
Qty: 3 ML | Refills: 1 | Status: SHIPPED | OUTPATIENT
Start: 2023-08-01

## 2023-08-01 RX ORDER — PHENTERMINE HYDROCHLORIDE 37.5 MG/1
CAPSULE ORAL
COMMUNITY
Start: 2023-06-27 | End: 2023-08-01 | Stop reason: ALTCHOICE

## 2023-08-01 ASSESSMENT — ENCOUNTER SYMPTOMS
COUGH: 0
CONSTIPATION: 0
CHEST TIGHTNESS: 0
SHORTNESS OF BREATH: 0
ABDOMINAL DISTENTION: 0
ABDOMINAL PAIN: 0

## 2023-08-01 NOTE — ASSESSMENT & PLAN NOTE
Uncontrolled, changes made today:  We will discontinue Adipex, and start Ozempic and follow-up in 8 weeks, will continue with lifestyle changes, patient has been congratulated for her efforts,, medication adherence emphasized and lifestyle modifications recommended

## 2023-08-01 NOTE — PROGRESS NOTES
Chief Complaint   Patient presents with    Follow-up     3 month f/u. Tam Elmore is a 44 y.o. female who presents today for Follow-up (3 month f/u. )     She is here to follow-up in elevated blood pressure and obesity, her blood pressure has been pretty normal, has been tolerating metformin, and phentermine, she continues to lose weight, reports some challenges in regards her bilateral knee pain, she recently had her corticosteroid injections and collagen injections, unfortunately the pain has been progressing, and has not been able to exercise as she would like to. She is ready to try other medications for weight loss, she believes her insurance may cover it, she has been now taking metformin and phentermine with some improvement, but if she is not able to lose more weight, her knee pain and degeneration will progress quickly,  She has been doing she has been making changes in her diet, and has attempted to walk every day      Wt Readings from Last 3 Encounters:   08/01/23 (!) 435 lb 3.2 oz (197.4 kg)   04/27/23 (!) 438 lb (198.7 kg)   03/16/23 (!) 441 lb (200 kg)     Vitals:    08/01/23 0758   BP: 110/70   Site: Right Wrist   Position: Sitting   Pulse: 95   SpO2: 98%   Weight: (!) 435 lb 3.2 oz (197.4 kg)   Height: 5' 5\" (1.651 m)        Assessment and plan:  1. Morbid obesity with BMI of 70 and over, adult [facility])  Assessment & Plan:   Uncontrolled, changes made today: We will discontinue Adipex, and start Ozempic and follow-up in 8 weeks, will continue with lifestyle changes, patient has been congratulated for her efforts,, medication adherence emphasized and lifestyle modifications recommended  Orders:  -     Semaglutide,0.25 or 0.5MG/DOS, (OZEMPIC, 0.25 OR 0.5 MG/DOSE,) 2 MG/3ML SOPN; Start  0.25  mg for 2 weeks and increase to 0.5 mg weekly, Disp-3 mL, R-1Normal  -     Lipid Panel; Future  -     Comprehensive Metabolic Panel; Future  -     Hemoglobin A1C; Future  2.  Elevated blood pressure normal... Well appearing, well nourished, awake, alert, oriented to person, place, time/situation and in no apparent distress.

## 2023-08-02 LAB
EST. AVERAGE GLUCOSE BLD GHB EST-MCNC: 97 MG/DL
HBA1C MFR BLD: 5 % (ref 4.8–5.6)

## 2023-09-25 DIAGNOSIS — E66.01 MORBID OBESITY WITH BMI OF 70 AND OVER, ADULT (HCC): ICD-10-CM

## 2023-09-26 ENCOUNTER — OFFICE VISIT (OUTPATIENT)
Dept: INTERNAL MEDICINE CLINIC | Facility: CLINIC | Age: 38
End: 2023-09-26
Payer: COMMERCIAL

## 2023-09-26 VITALS
DIASTOLIC BLOOD PRESSURE: 66 MMHG | SYSTOLIC BLOOD PRESSURE: 108 MMHG | BODY MASS INDEX: 48.82 KG/M2 | OXYGEN SATURATION: 100 % | HEART RATE: 88 BPM | HEIGHT: 65 IN | WEIGHT: 293 LBS

## 2023-09-26 DIAGNOSIS — E66.01 MORBID OBESITY WITH BMI OF 60.0-69.9, ADULT (HCC): ICD-10-CM

## 2023-09-26 PROCEDURE — 99213 OFFICE O/P EST LOW 20 MIN: CPT | Performed by: INTERNAL MEDICINE

## 2023-09-26 RX ORDER — METFORMIN HYDROCHLORIDE 500 MG/1
1000 TABLET, EXTENDED RELEASE ORAL
Qty: 180 TABLET | Refills: 1 | Status: SHIPPED | OUTPATIENT
Start: 2023-09-26

## 2023-09-26 RX ORDER — SEMAGLUTIDE 1.34 MG/ML
1 INJECTION, SOLUTION SUBCUTANEOUS WEEKLY
Qty: 3 ML | Refills: 1 | Status: SHIPPED | OUTPATIENT
Start: 2023-09-26

## 2023-09-26 RX ORDER — SEMAGLUTIDE 0.68 MG/ML
INJECTION, SOLUTION SUBCUTANEOUS
Qty: 3 ML | Refills: 1 | Status: CANCELLED | OUTPATIENT
Start: 2023-09-26

## 2023-09-26 RX ORDER — SEMAGLUTIDE 0.68 MG/ML
INJECTION, SOLUTION SUBCUTANEOUS
Qty: 3 ML | Refills: 1 | OUTPATIENT
Start: 2023-09-26

## 2023-09-26 ASSESSMENT — ENCOUNTER SYMPTOMS
ABDOMINAL PAIN: 0
ABDOMINAL DISTENTION: 0
CHEST TIGHTNESS: 0
COUGH: 0
SHORTNESS OF BREATH: 0
CONSTIPATION: 0

## 2023-09-26 NOTE — ASSESSMENT & PLAN NOTE
Has been tolerating Ozempic, will increase to 1 mg, continue metformin, may titrate to 2 mg if tolerated,  Encourage her to continue exercise, dietary changes,  In regards to her lower extremity edema, will continue with compression socks, elevation of legs

## 2023-11-30 DIAGNOSIS — E66.01 MORBID OBESITY WITH BMI OF 60.0-69.9, ADULT (HCC): ICD-10-CM

## 2023-11-30 RX ORDER — SEMAGLUTIDE 2.68 MG/ML
2 INJECTION, SOLUTION SUBCUTANEOUS WEEKLY
Qty: 3 ML | Refills: 1 | Status: SHIPPED | OUTPATIENT
Start: 2023-11-30

## 2023-12-05 ENCOUNTER — PATIENT MESSAGE (OUTPATIENT)
Dept: ORTHOPEDIC SURGERY | Age: 38
End: 2023-12-05

## 2024-01-03 ENCOUNTER — OFFICE VISIT (OUTPATIENT)
Dept: INTERNAL MEDICINE CLINIC | Facility: CLINIC | Age: 39
End: 2024-01-03
Payer: COMMERCIAL

## 2024-01-03 VITALS
WEIGHT: 293 LBS | HEART RATE: 97 BPM | HEIGHT: 63 IN | SYSTOLIC BLOOD PRESSURE: 118 MMHG | OXYGEN SATURATION: 90 % | BODY MASS INDEX: 51.91 KG/M2 | DIASTOLIC BLOOD PRESSURE: 88 MMHG

## 2024-01-03 DIAGNOSIS — E66.01 MORBID OBESITY WITH BMI OF 70 AND OVER, ADULT (HCC): Primary | ICD-10-CM

## 2024-01-03 DIAGNOSIS — E66.01 MORBID OBESITY WITH BMI OF 60.0-69.9, ADULT (HCC): ICD-10-CM

## 2024-01-03 PROCEDURE — 99214 OFFICE O/P EST MOD 30 MIN: CPT | Performed by: INTERNAL MEDICINE

## 2024-01-03 RX ORDER — ELETRIPTAN HYDROBROMIDE 20 MG/1
20 TABLET, FILM COATED ORAL DAILY
COMMUNITY
Start: 2023-12-29

## 2024-01-03 RX ORDER — SEMAGLUTIDE 2.68 MG/ML
2 INJECTION, SOLUTION SUBCUTANEOUS WEEKLY
Qty: 3 ML | Refills: 1 | Status: SHIPPED | OUTPATIENT
Start: 2024-01-03

## 2024-01-03 ASSESSMENT — PATIENT HEALTH QUESTIONNAIRE - PHQ9
SUM OF ALL RESPONSES TO PHQ QUESTIONS 1-9: 0
1. LITTLE INTEREST OR PLEASURE IN DOING THINGS: 0
SUM OF ALL RESPONSES TO PHQ QUESTIONS 1-9: 0
SUM OF ALL RESPONSES TO PHQ QUESTIONS 1-9: 0
SUM OF ALL RESPONSES TO PHQ9 QUESTIONS 1 & 2: 0
2. FEELING DOWN, DEPRESSED OR HOPELESS: 0
SUM OF ALL RESPONSES TO PHQ QUESTIONS 1-9: 0

## 2024-01-03 ASSESSMENT — ENCOUNTER SYMPTOMS
ABDOMINAL PAIN: 0
SHORTNESS OF BREATH: 0
CHEST TIGHTNESS: 0
CONSTIPATION: 0
ABDOMINAL DISTENTION: 0
COUGH: 0

## 2024-01-03 NOTE — PROGRESS NOTES
MG/DOSE,) 8 MG/3ML SOPN; Inject 2 mg into the skin once a week, Disp-3 mL, R-1Normal      Return in about 3 months (around 4/3/2024).     Review of system:    Review of Systems   Constitutional:  Negative for activity change, chills, fatigue and fever.   Respiratory:  Negative for cough, chest tightness and shortness of breath.    Cardiovascular:  Negative for chest pain.   Gastrointestinal:  Negative for abdominal distention, abdominal pain and constipation.   Neurological:  Negative for dizziness and headaches.   Psychiatric/Behavioral:  The patient is not nervous/anxious.          Immunization history:    Immunization History   Administered Date(s) Administered    COVID-19, MODERNA BLUE border, Primary or Immunocompromised, (age 12y+), IM, 100 mcg/0.5mL 2021, 2021    TDaP, ADACEL (age 10y-64y), BOOSTRIX (age 10y+), IM, 0.5mL 2023       Current medications:      Current Outpatient Medications:     eletriptan (RELPAX) 20 MG tablet, Take 1 tablet by mouth daily, Disp: , Rfl:     Insulin Pen Needle 31G X 8 MM MISC, 1 each by Does not apply route once a week, Disp: 12 each, Rfl: 0    Semaglutide, 2 MG/DOSE, (OZEMPIC, 2 MG/DOSE,) 8 MG/3ML SOPN, Inject 2 mg into the skin once a week, Disp: 3 mL, Rfl: 1    metFORMIN (GLUCOPHAGE-XR) 500 MG extended release tablet, Take 2 tablets by mouth daily (with breakfast), Disp: 180 tablet, Rfl: 1      Family history:    Family History   Problem Relation Age of Onset    Hypothyroidism Mother     Lung Cancer Father     Heart Attack Father     Diabetes type 2  Father         Past medical history:    History reviewed. No pertinent past medical history.     Past Surgical History:   Procedure Laterality Date     SECTION, CLASSIC      X3    TONSILLECTOMY AND ADENOIDECTOMY          Physical exam:    /88 (Site: Right Upper Arm, Position: Sitting)   Pulse 97   Ht 1.6 m (5' 3\")   Wt (!) 186 kg (410 lb)   SpO2 90%   BMI 72.63 kg/m²     Physical Exam  Vitals

## 2024-03-19 ENCOUNTER — OFFICE VISIT (OUTPATIENT)
Dept: ORTHOPEDIC SURGERY | Age: 39
End: 2024-03-19
Payer: COMMERCIAL

## 2024-03-19 DIAGNOSIS — M17.12 PRIMARY OSTEOARTHRITIS OF LEFT KNEE: ICD-10-CM

## 2024-03-19 DIAGNOSIS — M17.11 PRIMARY OSTEOARTHRITIS OF RIGHT KNEE: Primary | ICD-10-CM

## 2024-03-19 PROCEDURE — 20610 DRAIN/INJ JOINT/BURSA W/O US: CPT | Performed by: ORTHOPAEDIC SURGERY

## 2024-03-19 RX ORDER — TRIAMCINOLONE ACETONIDE 40 MG/ML
40 INJECTION, SUSPENSION INTRA-ARTICULAR; INTRAMUSCULAR ONCE
Status: COMPLETED | OUTPATIENT
Start: 2024-03-19 | End: 2024-03-19

## 2024-03-19 RX ADMIN — TRIAMCINOLONE ACETONIDE 40 MG: 40 INJECTION, SUSPENSION INTRA-ARTICULAR; INTRAMUSCULAR at 08:34

## 2024-03-19 NOTE — PROGRESS NOTES
Name: Deja Busby  YOB: 1985  Gender: female  MRN: [id number]      CC: Knee Pain (B)       HPI: Deja Busby is a 38 y.o. female who returns for follow up on bilateral knees requesting repeat cortisone injections.  She wants to talk about radiofrequency ablation for her chronic knee pain she is not a surgical candidate.        Physical Examination:  General: no acute distress  Lungs: breathing easily  CV: regular rhythm by pulse  Right Knee and Left Knee: Diffuse tenderness palpation crepitus with passive active range of motion medial lateral joint line        Assessment:     ICD-10-CM    1. Primary osteoarthritis of right knee  M17.11       2. Primary osteoarthritis of left knee  M17.12           Plan:   End-stage arthritis she is not a surgical candidate.  She requested repeat cortisone injections today which we provided.  We also placed a referral to Dr. Garibay for discussion of radiofrequency ablation and management of her chronic knee pain going forward.  The patient elected to proceed with an intraarticular knee injection today.  After verbal informed consent was obtained after sterile prep the Bilateral knee  was injected from a anterior lateral approach with 4 cc of 0.25% Marcaine and 1 cc of 40 mg Kenalog.  The patient tolerated the procedure well was given postinjection flare precautions.           Peng Eagle MD, FAAOS  Orthopaedics and Sports Medicine

## 2024-03-28 DIAGNOSIS — E66.01 MORBID OBESITY WITH BMI OF 60.0-69.9, ADULT (HCC): ICD-10-CM

## 2024-03-28 RX ORDER — SEMAGLUTIDE 2.68 MG/ML
2 INJECTION, SOLUTION SUBCUTANEOUS WEEKLY
Qty: 3 ML | Refills: 2 | Status: SHIPPED | OUTPATIENT
Start: 2024-03-28

## 2024-04-03 ENCOUNTER — OFFICE VISIT (OUTPATIENT)
Dept: INTERNAL MEDICINE CLINIC | Facility: CLINIC | Age: 39
End: 2024-04-03
Payer: COMMERCIAL

## 2024-04-03 VITALS
HEIGHT: 63 IN | WEIGHT: 293 LBS | SYSTOLIC BLOOD PRESSURE: 110 MMHG | HEART RATE: 81 BPM | OXYGEN SATURATION: 98 % | BODY MASS INDEX: 51.91 KG/M2 | DIASTOLIC BLOOD PRESSURE: 78 MMHG

## 2024-04-03 DIAGNOSIS — E66.01 MORBID OBESITY WITH BMI OF 70 AND OVER, ADULT (HCC): Primary | ICD-10-CM

## 2024-04-03 PROCEDURE — 99213 OFFICE O/P EST LOW 20 MIN: CPT | Performed by: INTERNAL MEDICINE

## 2024-04-03 RX ORDER — SEMAGLUTIDE 2.68 MG/ML
3 INJECTION, SOLUTION SUBCUTANEOUS WEEKLY
Qty: 3 ML | Refills: 2 | Status: SHIPPED | OUTPATIENT
Start: 2024-04-03

## 2024-04-03 SDOH — ECONOMIC STABILITY: FOOD INSECURITY: WITHIN THE PAST 12 MONTHS, THE FOOD YOU BOUGHT JUST DIDN'T LAST AND YOU DIDN'T HAVE MONEY TO GET MORE.: NEVER TRUE

## 2024-04-03 SDOH — ECONOMIC STABILITY: FOOD INSECURITY: WITHIN THE PAST 12 MONTHS, YOU WORRIED THAT YOUR FOOD WOULD RUN OUT BEFORE YOU GOT MONEY TO BUY MORE.: NEVER TRUE

## 2024-04-03 SDOH — ECONOMIC STABILITY: INCOME INSECURITY: HOW HARD IS IT FOR YOU TO PAY FOR THE VERY BASICS LIKE FOOD, HOUSING, MEDICAL CARE, AND HEATING?: NOT VERY HARD

## 2024-04-03 ASSESSMENT — ENCOUNTER SYMPTOMS
COUGH: 0
CHEST TIGHTNESS: 0
SHORTNESS OF BREATH: 0
ABDOMINAL PAIN: 0
ABDOMINAL DISTENTION: 0
CONSTIPATION: 0

## 2024-04-03 NOTE — PROGRESS NOTES
Chief Complaint   Patient presents with    Follow-up     3 month f/u         Deja Busby is a 38 y.o. female who presents today for Follow-up (3 month f/u )     Multiple stressors , going to court with her ex   Has gained 10 LB since last visit  Walking more  Knee pain some improvement after cortisone, planning nerve ablation  Now in 3 mg of ozempic  Has been trying to make changes in regards to physical activity, she moved her office to the basement so she needs to go up the stairs,  Has been making changes in her diet, decreasing portion size,      Wt Readings from Last 3 Encounters:   04/03/24 (!) 190.5 kg (420 lb 1.3 oz)   01/03/24 (!) 186 kg (410 lb)   09/26/23 (!) 190.1 kg (419 lb 3.2 oz)     Vitals:    04/03/24 0803   BP: 110/78   Site: Left Upper Arm   Position: Sitting   Pulse: 81   SpO2: 98%   Weight: (!) 190.5 kg (420 lb 1.3 oz)   Height: 1.6 m (5' 3\")        Assessment and plan:  1. Morbid obesity with BMI of 70 and over, adult (HCC)  -     semaglutide, 2 MG/DOSE, (OZEMPIC, 2 MG/DOSE,) 8 MG/3ML SOPN sc injection; Inject 1.13 mLs into the skin once a week, Disp-3 mL, R-2Normal    She is going to continue using 3 mg of Ozempic,  She would like to loss at least 20 more pounds in the next 4 to 6 months,  Her smart goals include, walking more, swimming  Continue with portion control  And hopefully after the stress with current ex- will improve within the next few months,  Planning to treatment by Ortho coming up in May, which may increase her mobility  Return in about 4 months (around 8/3/2024).     Review of system:    Review of Systems   Constitutional:  Negative for activity change, chills, fatigue and fever.   Respiratory:  Negative for cough, chest tightness and shortness of breath.    Cardiovascular:  Negative for chest pain.   Gastrointestinal:  Negative for abdominal distention, abdominal pain and constipation.   Musculoskeletal:  Positive for arthralgias.   Neurological:

## 2024-05-16 NOTE — PROGRESS NOTES
Chronic Pain Consult Note      Plan:     A comprehensive pain management plan may consist of the following: Testing, Therapy, Medications, Interventions, Consults, and Follow up.    Bilateral knee primary osteoarthritis  Schedule bilateral genicular nerve blocks  Briefly discussed genicular RFA's pending response  Encouraged continued home exercise regimen  Patient not a surgical candidate at this time  Chronic pain syndrome  Encourage continuation of active healthy lifestyle    General Recommendations: The pain condition that the patient suffers from is best treated with a multidisciplinary approach that involves an increase in physical activity to prevent de-conditioning and worsening of the pain cycle, as well as psychological counseling (formal and/or informal) to address the co morbid psychological effects of pain. Treatment will often involve judicious use of pain medications and interventional procedures to decrease the pain, allowing the patient to participate in the physical activity that will ultimately produce long-lasting pain reductions. The goal of the multidisciplinary approach is to return the patient to a higher level of overall function and to restore their ability to perform activities of daily living.      Referring Provider: Peng Eagle MD  Assessment:      Chief Complaint: New Patient (Bilateral knee pain)      Deja Busby is a 38 y.o. female [facility] for the following diagnoses:    Diagnosis:  No diagnosis found.      Subjective:      HPI:  Ms. Busby is seen in consultation at the request of Peng Eagle MD for evaluation and recommendations regarding the above diagnoses and the below HPI.    HPI on05/22/24: 38-year-old female presents for evaluation treatment of bilateral knee pain.  Patient reports pains been present since February 2021 denying specific inciting event.  Pain has been gradually worsening along the anterior aspect of both knees.  She does

## 2024-05-22 ENCOUNTER — OFFICE VISIT (OUTPATIENT)
Age: 39
End: 2024-05-22
Payer: COMMERCIAL

## 2024-05-22 DIAGNOSIS — G89.29 BILATERAL CHRONIC KNEE PAIN: Primary | ICD-10-CM

## 2024-05-22 DIAGNOSIS — M25.561 BILATERAL CHRONIC KNEE PAIN: Primary | ICD-10-CM

## 2024-05-22 DIAGNOSIS — M25.562 BILATERAL CHRONIC KNEE PAIN: Primary | ICD-10-CM

## 2024-05-22 PROCEDURE — 99204 OFFICE O/P NEW MOD 45 MIN: CPT | Performed by: ANESTHESIOLOGY

## 2024-06-20 ENCOUNTER — OFFICE VISIT (OUTPATIENT)
Dept: ORTHOPEDIC SURGERY | Age: 39
End: 2024-06-20

## 2024-06-20 DIAGNOSIS — M17.0 BILATERAL PRIMARY OSTEOARTHRITIS OF KNEE: Primary | ICD-10-CM

## 2024-06-20 NOTE — PROGRESS NOTES
ESEQUIEL    NAME: Deja Busby  ID:[id number]   :1985    Location: POA    Procedure: bilateral Genicular Nerve Blocks Under Fluoroscopic Imaging  Lateral Retinacular Nerve Branch of the Sciatic Nerve.  Medial Retinacular Nerve Branch of the Femoral Nerve  3.   Infrapatellar Nerve Branch of the Saphenous Nerve     Pre-op Diagnosis: Knee Osteoarthritis    Post-op Diagnosis: Same     Anesthesia: Local only     Complications: None    After confirming written and informed consent and discussing the risk, benefits and alternatives for the procedure, the patient had the correct site marked by the physician performing the procedure. The specific risks of bleeding and infection were discussed. The patient was taken to the fluoroscopy suite.    The patient was placed in the supine position. A pulse oximeter was placed, and verbal and visual monitoring were maintained throughout the procedure. The physician cleaned his hands with an alcohol containing solution. The physician wore a hat and mask, sterile gloves were applied. The skin overlying the left followed by the right knee was cleaned with chlorhexadine gluconate. Sterile towels were applied as a drape. A timeout was performed involving the patient, physician and radiation technologist.  The expected path of the lateral retinacular nerve (superiolateral geniculate nerve), a branch of the sciatic nerve. was then identified using fluoroscopy. The skin overlying the superiolateral femoral condyle was anesthetized with 1% lidocaine via a 25 G 1.5 inch needle. Next, using a coaxial technique with intermittent fluoroscopic guidance a 25 G 3.5 inch spinal needle was advanced toward the lateral midpoint of the femoral shaft at the junction of the superiolateral femoral condyle and shaft.    Next, the expected path of the medial retinacular nerve, a branch of the femoral nerve (superiomedial geniculate nerve) was then identified using fluoroscopy. The skin overlying

## 2024-06-23 DIAGNOSIS — E66.01 MORBID OBESITY WITH BMI OF 60.0-69.9, ADULT (HCC): ICD-10-CM

## 2024-06-24 ENCOUNTER — TELEPHONE (OUTPATIENT)
Age: 39
End: 2024-06-24

## 2024-06-24 RX ORDER — METFORMIN HYDROCHLORIDE 500 MG/1
1000 TABLET, EXTENDED RELEASE ORAL
Qty: 180 TABLET | Refills: 1 | Status: SHIPPED | OUTPATIENT
Start: 2024-06-24

## 2024-06-24 NOTE — TELEPHONE ENCOUNTER
6/20/24 - Procedure: bilateral Genicular Nerve Blocks Under Fluoroscopic Imaging  Lateral Retinacular Nerve Branch of the Sciatic Nerve.  Medial Retinacular Nerve Branch of the Femoral Nerve  3.   Infrapatellar Nerve Branch of the Saphenous Nerve     Lmvm to f/u on procedure.    6/27/24 - lmvm to f/u on procedure    [employer] message sent for results as patient seems to want to proceed with ablation.

## 2024-07-02 ENCOUNTER — TELEPHONE (OUTPATIENT)
Dept: ORTHOPEDIC SURGERY | Age: 39
End: 2024-07-02

## 2024-07-02 NOTE — TELEPHONE ENCOUNTER
Patient  has  sent  a my chart  message  to [employer].  She  wants  to  schedule  an abalation  for both knees..  I was  told  this message  needed to  go  to  [place]- but it  is for  the pts  knees  it  was a my chart message       Please  call this pt

## 2024-07-05 DIAGNOSIS — E66.01 MORBID OBESITY WITH BMI OF 60.0-69.9, ADULT (HCC): ICD-10-CM

## 2024-07-08 DIAGNOSIS — E66.01 MORBID OBESITY WITH BMI OF 70 AND OVER, ADULT (HCC): ICD-10-CM

## 2024-07-08 NOTE — TELEPHONE ENCOUNTER
Requested Prescriptions     Pending Prescriptions Disp Refills    semaglutide, 2 MG/DOSE, (OZEMPIC, 2 MG/DOSE,) 8 MG/3ML SOPN sc injection 3 mL 2     Sig: Inject 3 mg into the skin once a week    Next ov 08/05/2024. Pharmacy confirmed.

## 2024-07-09 RX ORDER — SEMAGLUTIDE 2.68 MG/ML
3 INJECTION, SOLUTION SUBCUTANEOUS WEEKLY
Qty: 3 ML | Refills: 2 | Status: SHIPPED | OUTPATIENT
Start: 2024-07-09

## 2024-07-10 RX ORDER — PEN NEEDLE, DIABETIC 31 GX5/16"
NEEDLE, DISPOSABLE MISCELLANEOUS
OUTPATIENT
Start: 2024-07-10

## 2024-08-05 ENCOUNTER — OFFICE VISIT (OUTPATIENT)
Dept: INTERNAL MEDICINE CLINIC | Facility: CLINIC | Age: 39
End: 2024-08-05
Payer: COMMERCIAL

## 2024-08-05 VITALS
BODY MASS INDEX: 51.91 KG/M2 | WEIGHT: 293 LBS | HEART RATE: 83 BPM | OXYGEN SATURATION: 96 % | SYSTOLIC BLOOD PRESSURE: 120 MMHG | HEIGHT: 63 IN | DIASTOLIC BLOOD PRESSURE: 82 MMHG

## 2024-08-05 DIAGNOSIS — E66.01 MORBID OBESITY WITH BMI OF 70 AND OVER, ADULT (HCC): ICD-10-CM

## 2024-08-05 DIAGNOSIS — G43.009 MIGRAINE WITHOUT AURA AND WITHOUT STATUS MIGRAINOSUS, NOT INTRACTABLE: ICD-10-CM

## 2024-08-05 DIAGNOSIS — Z01.89 ENCOUNTER FOR ROUTINE LABORATORY TESTING: ICD-10-CM

## 2024-08-05 DIAGNOSIS — E66.01 MORBID OBESITY WITH BMI OF 70 AND OVER, ADULT (HCC): Primary | ICD-10-CM

## 2024-08-05 LAB
ALBUMIN SERPL-MCNC: 3.7 G/DL (ref 3.5–5)
ALBUMIN/GLOB SERPL: 1.4 (ref 1–1.9)
ALP SERPL-CCNC: 49 U/L (ref 35–104)
ALT SERPL-CCNC: 35 U/L (ref 12–65)
ANION GAP SERPL CALC-SCNC: 11 MMOL/L (ref 9–18)
AST SERPL-CCNC: 34 U/L (ref 15–37)
BASOPHILS # BLD: 0 K/UL (ref 0–0.2)
BASOPHILS NFR BLD: 1 % (ref 0–2)
BILIRUB SERPL-MCNC: 0.5 MG/DL (ref 0–1.2)
BUN SERPL-MCNC: 7 MG/DL (ref 6–23)
CALCIUM SERPL-MCNC: 9.8 MG/DL (ref 8.8–10.2)
CHLORIDE SERPL-SCNC: 106 MMOL/L (ref 98–107)
CHOLEST SERPL-MCNC: 178 MG/DL (ref 0–200)
CO2 SERPL-SCNC: 25 MMOL/L (ref 20–28)
CREAT SERPL-MCNC: 0.79 MG/DL (ref 0.6–1.1)
DIFFERENTIAL METHOD BLD: ABNORMAL
EOSINOPHIL # BLD: 0.3 K/UL (ref 0–0.8)
EOSINOPHIL NFR BLD: 4 % (ref 0.5–7.8)
ERYTHROCYTE [DISTWIDTH] IN BLOOD BY AUTOMATED COUNT: 13.2 % (ref 11.9–14.6)
EST. AVERAGE GLUCOSE BLD GHB EST-MCNC: 102 MG/DL
GLOBULIN SER CALC-MCNC: 2.8 G/DL (ref 2.3–3.5)
GLUCOSE SERPL-MCNC: 85 MG/DL (ref 70–99)
HBA1C MFR BLD: 5.2 % (ref 0–5.6)
HCT VFR BLD AUTO: 46.8 % (ref 35.8–46.3)
HDLC SERPL-MCNC: 65 MG/DL (ref 40–60)
HDLC SERPL: 2.8 (ref 0–5)
HGB BLD-MCNC: 14.9 G/DL (ref 11.7–15.4)
IMM GRANULOCYTES # BLD AUTO: 0 K/UL (ref 0–0.5)
IMM GRANULOCYTES NFR BLD AUTO: 1 % (ref 0–5)
LDLC SERPL CALC-MCNC: 96 MG/DL (ref 0–100)
LYMPHOCYTES # BLD: 2.3 K/UL (ref 0.5–4.6)
LYMPHOCYTES NFR BLD: 38 % (ref 13–44)
MCH RBC QN AUTO: 29 PG (ref 26.1–32.9)
MCHC RBC AUTO-ENTMCNC: 31.8 G/DL (ref 31.4–35)
MCV RBC AUTO: 91.1 FL (ref 82–102)
MONOCYTES # BLD: 0.5 K/UL (ref 0.1–1.3)
MONOCYTES NFR BLD: 9 % (ref 4–12)
NEUTS SEG # BLD: 2.9 K/UL (ref 1.7–8.2)
NEUTS SEG NFR BLD: 47 % (ref 43–78)
NRBC # BLD: 0 K/UL (ref 0–0.2)
PLATELET # BLD AUTO: 263 K/UL (ref 150–450)
PMV BLD AUTO: 11.1 FL (ref 9.4–12.3)
POTASSIUM SERPL-SCNC: 4.2 MMOL/L (ref 3.5–5.1)
PROT SERPL-MCNC: 6.5 G/DL (ref 6.3–8.2)
RBC # BLD AUTO: 5.14 M/UL (ref 4.05–5.2)
SODIUM SERPL-SCNC: 142 MMOL/L (ref 136–145)
TRIGL SERPL-MCNC: 87 MG/DL (ref 0–150)
TSH W FREE THYROID IF ABNORMAL: 1.15 UIU/ML (ref 0.27–4.2)
VLDLC SERPL CALC-MCNC: 17 MG/DL (ref 6–23)
WBC # BLD AUTO: 6 K/UL (ref 4.3–11.1)

## 2024-08-05 PROCEDURE — 99214 OFFICE O/P EST MOD 30 MIN: CPT | Performed by: INTERNAL MEDICINE

## 2024-08-05 RX ORDER — IPRATROPIUM BROMIDE 21 UG/1
2 SPRAY, METERED NASAL 2 TIMES DAILY
COMMUNITY
Start: 2024-04-29 | End: 2024-08-05 | Stop reason: ALTCHOICE

## 2024-08-05 RX ORDER — ELETRIPTAN HYDROBROMIDE 20 MG/1
20 TABLET, FILM COATED ORAL DAILY
Qty: 9 TABLET | Refills: 1 | Status: SHIPPED | OUTPATIENT
Start: 2024-08-05

## 2024-08-05 RX ORDER — TIRZEPATIDE 5 MG/.5ML
5 INJECTION, SOLUTION SUBCUTANEOUS WEEKLY
Qty: 4 ADJUSTABLE DOSE PRE-FILLED PEN SYRINGE | Refills: 1 | Status: SHIPPED | OUTPATIENT
Start: 2024-08-05

## 2024-08-05 ASSESSMENT — ENCOUNTER SYMPTOMS
CONSTIPATION: 0
ABDOMINAL PAIN: 0
SHORTNESS OF BREATH: 0
COUGH: 0
ABDOMINAL DISTENTION: 0
CHEST TIGHTNESS: 0

## 2024-08-05 NOTE — PROGRESS NOTES
Chief Complaint   Patient presents with    Follow-up     Discuss anxiety. F/u weight loss        Deja Busby is a 38 y.o. female who presents today for Follow-up (Discuss anxiety. F/u weight loss)     She is here for follow up in weight management , on ozempic ,heaviest weight 480 lb , now matheus to 406, buy she feel her weight loss is not helping with her pain , planning to have Knee procedure and ablation ,   Reports increase in anxiety due to recently losing her job  Requesting refill on relpax for migraines , took her last tablet few weeks ago  Continue to move and make changes in her diet    Wt Readings from Last 3 Encounters:   08/05/24 (!) 184.2 kg (406 lb)   04/03/24 (!) 190.5 kg (420 lb 1.3 oz)   01/03/24 (!) 186 kg (410 lb)     Vitals:    08/05/24 1142   BP: 120/82   Pulse: 83   SpO2: 96%   Weight: (!) 184.2 kg (406 lb)   Height: 1.6 m (5' 3\")        Assessment and plan:  1. Morbid obesity with BMI of 70 and over, adult (HCC)  -     [facility]  -     Tirzepatide (MOUNJARO) 5 MG/0.5ML SOPN SC injection; Inject 0.5 mLs into the skin once a week, Disp-4 Adjustable Dose Pre-filled Pen Syringe, R-1Normal  -     Hemoglobin A1C; Future  -     CBC with Auto Differential; Future  -     Comprehensive Metabolic Panel; Future  -     Lipid Panel; Future  -     TSH with Reflex; Future  2. Migraine without aura and without status migrainosus, not intractable  -     eletriptan (RELPAX) 20 MG tablet; Take 1 tablet by mouth daily, Disp-9 tablet, R-1Normal  3. Encounter for routine laboratory testing  -     Hemoglobin A1C; Future  -     CBC with Auto Differential; Future  -     Comprehensive Metabolic Panel; Future  -     Lipid Panel; Future  -     TSH with Reflex; Future    Discussed about changing to Mounjaro and consider weight management program  Referral placed  Will continue with relpax for prophylaxis   Will contact me back if anxiety get worst     Return in about 3 months

## 2024-08-29 NOTE — PROGRESS NOTES
Procedure Date: 2024      Location: [facility]       Procedure: bilateral genicular RFA       Time Out performed prior to start of the procedure:       Avila Garibay M.D.  performed the following reviews on Deja Busby 1985 prior to the start of the procedure:       patient was identified by name and     agreement on procedure being performed was verified   risks and benefits explained to patient by the provider  procedure site verified as Bilateral  patient was positioned for comfort   consent signed and verified for procedure       Time:  3:55 PM        Procedure performed by:   Avila Garibay M.D.       Patient assisted by:   Paris Villarreal MA

## 2024-09-03 ENCOUNTER — OFFICE VISIT (OUTPATIENT)
Dept: ORTHOPEDIC SURGERY | Age: 39
End: 2024-09-03

## 2024-09-03 DIAGNOSIS — M25.561 PAIN IN BOTH KNEES, UNSPECIFIED CHRONICITY: Primary | ICD-10-CM

## 2024-09-03 DIAGNOSIS — M25.562 PAIN IN BOTH KNEES, UNSPECIFIED CHRONICITY: Primary | ICD-10-CM

## 2024-09-03 NOTE — PROGRESS NOTES
[facility]    NAME: Deja Busby   ID:[id number]   :1985    Location: [facility]    Procedure:bilateral Genicular Nerve Radiofrequency Ablation Under Fluoroscopic Imaging  Lateral Retinacular Nerve Branch of the Sciatic Nerve.  Medial Retinacular Nerve Branch of the Femoral Nerve  lnfrapatellar Nerve Branch of the Saphenous Nerve       Pre-op Diagnosis: Knee Osteoarthritis    Post-op Diagnosis: Same     Anesthesia: Local only     Complications: None    After confirming written and informed consent and discussing the risk, benefits and alternatives for the procedure, the patient had the correct site marked by the physician performing the procedure. The specific risks of bleeding, infection and nerve injury were discussed. The patient was taken to the fluoroscopy suite.    The patient was placed in the supine position. A pulse oximeter was placed, and verbal and visual monitoring were maintained throughout the procedure. The physician cleaned his hands with an alcohol containing  solution. The physician wore a hat and mask, sterile gloves were applied. The skin overlying the left then right knee was cleaned with chlorhexadine gluconate. Sterile towels were applied as a drape. A timeout was performed involving the patient, physician and radiology technician.    A20 G, 100 cm cannula with 5 mm active tip was used for each nerve.    The expected path of the lateral retinacular nerve (superiolateral geniculate nerve), a branch of the sciatic nerve. was then identified using fluoroscopy. The skin overlying the superiolateral femoral condyle was anesthetized with 1% lidocaine via a 25 G 1.5 inch needle. Next, using a coaxial technique with intermittent fluoroscopic guidance the radiofrequency cannula was advanced toward the lateral midpoint of the femoral shaft at the junction of the superiolateral femoral condyle and shaft.    Next, the expected path of the medial retinacular nerve, a branch of the femoral nerve

## 2024-09-20 ENCOUNTER — OFFICE VISIT (OUTPATIENT)
Dept: ORTHOPEDIC SURGERY | Age: 39
End: 2024-09-20

## 2024-09-20 DIAGNOSIS — M25.562 PAIN IN BOTH KNEES, UNSPECIFIED CHRONICITY: Primary | ICD-10-CM

## 2024-09-20 DIAGNOSIS — M25.561 PAIN IN BOTH KNEES, UNSPECIFIED CHRONICITY: Primary | ICD-10-CM

## 2024-09-28 DIAGNOSIS — E66.01 MORBID OBESITY WITH BMI OF 70 AND OVER, ADULT: ICD-10-CM

## 2024-09-30 DIAGNOSIS — G43.009 MIGRAINE WITHOUT AURA AND WITHOUT STATUS MIGRAINOSUS, NOT INTRACTABLE: ICD-10-CM

## 2024-09-30 RX ORDER — TIRZEPATIDE 5 MG/.5ML
INJECTION, SOLUTION SUBCUTANEOUS
Qty: 2 ADJUSTABLE DOSE PRE-FILLED PEN SYRINGE | Refills: 1 | Status: SHIPPED | OUTPATIENT
Start: 2024-09-30

## 2024-09-30 RX ORDER — ELETRIPTAN HYDROBROMIDE 20 MG/1
20 TABLET, FILM COATED ORAL DAILY
Qty: 9 TABLET | Refills: 1 | Status: SHIPPED | OUTPATIENT
Start: 2024-09-30

## 2024-09-30 NOTE — TELEPHONE ENCOUNTER
Requested Prescriptions     Pending Prescriptions Disp Refills    eletriptan (RELPAX) 20 MG tablet [Pharmacy Med Name: ELETRIPTAN HBR 20 MG TABLET] 9 tablet 1     Sig: TAKE 1 TABLET BY MOUTH EVERY DAY      Next ov 11/21/2024. Pharmacy confirmed.

## 2024-10-02 DIAGNOSIS — E66.01 MORBID OBESITY WITH BMI OF 70 AND OVER, ADULT: ICD-10-CM

## 2024-10-02 DIAGNOSIS — G43.009 MIGRAINE WITHOUT AURA AND WITHOUT STATUS MIGRAINOSUS, NOT INTRACTABLE: ICD-10-CM

## 2024-10-02 RX ORDER — ELETRIPTAN HYDROBROMIDE 20 MG/1
20 TABLET, FILM COATED ORAL DAILY
Qty: 9 TABLET | Refills: 1 | OUTPATIENT
Start: 2024-10-02

## 2024-10-07 DIAGNOSIS — E66.01 MORBID OBESITY WITH BMI OF 70 AND OVER, ADULT: ICD-10-CM

## 2024-10-08 RX ORDER — TIRZEPATIDE 5 MG/.5ML
5 INJECTION, SOLUTION SUBCUTANEOUS WEEKLY
Qty: 2 ADJUSTABLE DOSE PRE-FILLED PEN SYRINGE | Refills: 1 | Status: SHIPPED | OUTPATIENT
Start: 2024-10-08

## 2024-10-08 NOTE — TELEPHONE ENCOUNTER
Requested Prescriptions     Pending Prescriptions Disp Refills    Tirzepatide (MOUNJARO) 5 MG/0.5ML SOPN SC injection 2 Adjustable Dose Pre-filled Pen Syringe 1    Next ov 11/21/2024. Pharmacy confirmed.

## 2024-11-13 ENCOUNTER — OFFICE VISIT (OUTPATIENT)
Dept: INTERNAL MEDICINE CLINIC | Facility: CLINIC | Age: 39
End: 2024-11-13
Payer: COMMERCIAL

## 2024-11-13 VITALS
OXYGEN SATURATION: 98 % | WEIGHT: 293 LBS | SYSTOLIC BLOOD PRESSURE: 138 MMHG | HEIGHT: 63 IN | HEART RATE: 60 BPM | DIASTOLIC BLOOD PRESSURE: 88 MMHG | BODY MASS INDEX: 51.91 KG/M2

## 2024-11-13 DIAGNOSIS — E66.01 MORBID OBESITY WITH BMI OF 60.0-69.9, ADULT: ICD-10-CM

## 2024-11-13 PROCEDURE — 99214 OFFICE O/P EST MOD 30 MIN: CPT | Performed by: INTERNAL MEDICINE

## 2024-11-13 RX ORDER — METFORMIN HYDROCHLORIDE 500 MG/1
1000 TABLET, EXTENDED RELEASE ORAL
Qty: 180 TABLET | Refills: 1 | Status: SHIPPED | OUTPATIENT
Start: 2024-11-13

## 2024-11-13 RX ORDER — TIRZEPATIDE 7.5 MG/.5ML
7.5 INJECTION, SOLUTION SUBCUTANEOUS WEEKLY
Qty: 2 ML | Refills: 0 | Status: SHIPPED | OUTPATIENT
Start: 2024-11-13

## 2024-11-13 RX ORDER — TIRZEPATIDE 10 MG/.5ML
10 INJECTION, SOLUTION SUBCUTANEOUS WEEKLY
Qty: 2 ML | Refills: 2 | Status: SHIPPED | OUTPATIENT
Start: 2024-11-13

## 2024-11-13 ASSESSMENT — ENCOUNTER SYMPTOMS
CHEST TIGHTNESS: 0
SHORTNESS OF BREATH: 0
CONSTIPATION: 0
ABDOMINAL DISTENTION: 0
ABDOMINAL PAIN: 0
COUGH: 0

## 2024-11-13 NOTE — PROGRESS NOTES
Chief Complaint   Patient presents with    Medication Refill     Weight management f/u.         Deja Busby is a 39 y.o. female who presents today for Medication Refill (Weight management f/u. 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 Morbid obesity with BMI of 60.0-69.9, adult  Assessment & Plan:  Starting weight was 480, current weight 391, she has been tolerating Mounjaro since the change, however going to increase to 7.5 mg, for 4 weeks and increase to 10 mg if tolerated, and follow-up in 3 months.  Encouraged to continue with physical activity, dietary changes,  Orders:  -     metFORMIN (GLUCOPHAGE-XR) 500 MG extended release tablet; Take 2 tablets by mouth daily (with breakfast), Disp-180 tablet, R-1Normal  -     Tirzepatide (MOUNJARO) 7.5 MG/0.5ML SOAJ; Inject 7.5 mg into the skin once a week After 4 weeks , increase to 10 mg, Disp-2 mL, R-0Normal  -     Tirzepatide (MOUNJARO) 10 MG/0.5ML SOAJ; Inject 10 mg into the skin once a week, Disp-2 mL, R-2Normal      Return in about 3 months (around 2/13/2025).     Review of system:    Review of Systems   Constitutional:  Negative for activity change, chills, fatigue and fever.   Respiratory:

## 2024-11-13 NOTE — ASSESSMENT & PLAN NOTE
Starting weight was 480, current weight 391, she has been tolerating Mounjaro since the change, however going to increase to 7.5 mg, for 4 weeks and increase to 10 mg if tolerated, and follow-up in 3 months.  Encouraged to continue with physical activity, dietary changes,

## 2024-11-29 DIAGNOSIS — E66.01 MORBID OBESITY WITH BMI OF 70 AND OVER, ADULT: ICD-10-CM

## 2024-11-29 RX ORDER — TIRZEPATIDE 5 MG/.5ML
INJECTION, SOLUTION SUBCUTANEOUS
Refills: 1 | OUTPATIENT
Start: 2024-11-29

## 2024-11-29 NOTE — TELEPHONE ENCOUNTER
Requested Prescriptions     Refused Prescriptions Disp Refills    MOUNJARO 5 MG/0.5ML SOAJ [Pharmacy Med Name: MOUNJARO 5 MG/0.5 ML PEN]  1     Sig: INJECT 0.5 ML SUBCUTANEOUSLY ONE TIME PER WEEK     Refused By: FAUSTINO VERA     Reason for Refusal: Medication dose changed

## 2024-12-08 DIAGNOSIS — E66.01 MORBID OBESITY WITH BMI OF 60.0-69.9, ADULT: ICD-10-CM

## 2024-12-09 RX ORDER — TIRZEPATIDE 7.5 MG/.5ML
7.5 INJECTION, SOLUTION SUBCUTANEOUS WEEKLY
OUTPATIENT
Start: 2024-12-09

## 2024-12-09 RX ORDER — TIRZEPATIDE 10 MG/.5ML
10 INJECTION, SOLUTION SUBCUTANEOUS WEEKLY
Qty: 2 ML | Refills: 2 | Status: SHIPPED | OUTPATIENT
Start: 2024-12-09

## 2024-12-12 ENCOUNTER — PATIENT MESSAGE (OUTPATIENT)
Dept: ORTHOPEDIC SURGERY | Age: 39
End: 2024-12-12

## 2025-01-21 DIAGNOSIS — E66.01 MORBID OBESITY WITH BMI OF 60.0-69.9, ADULT: ICD-10-CM

## 2025-01-22 RX ORDER — TIRZEPATIDE 10 MG/.5ML
10 INJECTION, SOLUTION SUBCUTANEOUS WEEKLY
Qty: 2 ML | Refills: 2 | Status: SHIPPED | OUTPATIENT
Start: 2025-01-22

## 2025-01-29 ENCOUNTER — PATIENT MESSAGE (OUTPATIENT)
Dept: INTERNAL MEDICINE CLINIC | Facility: CLINIC | Age: 40
End: 2025-01-29

## 2025-03-03 ENCOUNTER — OFFICE VISIT (OUTPATIENT)
Dept: ORTHOPEDIC SURGERY | Age: 40
End: 2025-03-03

## 2025-03-03 DIAGNOSIS — M17.0 BILATERAL PRIMARY OSTEOARTHRITIS OF KNEE: Primary | ICD-10-CM

## 2025-03-03 RX ORDER — TRIAMCINOLONE ACETONIDE 40 MG/ML
40 INJECTION, SUSPENSION INTRA-ARTICULAR; INTRAMUSCULAR ONCE
Status: COMPLETED | OUTPATIENT
Start: 2025-03-03 | End: 2025-03-03

## 2025-03-03 RX ADMIN — TRIAMCINOLONE ACETONIDE 40 MG: 40 INJECTION, SUSPENSION INTRA-ARTICULAR; INTRAMUSCULAR at 11:12

## 2025-03-06 ENCOUNTER — OFFICE VISIT (OUTPATIENT)
Dept: INTERNAL MEDICINE CLINIC | Facility: CLINIC | Age: 40
End: 2025-03-06
Payer: COMMERCIAL

## 2025-03-06 VITALS
HEIGHT: 63 IN | HEART RATE: 52 BPM | SYSTOLIC BLOOD PRESSURE: 118 MMHG | BODY MASS INDEX: 51.91 KG/M2 | OXYGEN SATURATION: 98 % | WEIGHT: 293 LBS | DIASTOLIC BLOOD PRESSURE: 76 MMHG

## 2025-03-06 DIAGNOSIS — E66.01 MORBID OBESITY WITH BMI OF 60.0-69.9, ADULT: Primary | ICD-10-CM

## 2025-03-06 PROCEDURE — 99214 OFFICE O/P EST MOD 30 MIN: CPT | Performed by: INTERNAL MEDICINE

## 2025-03-06 SDOH — ECONOMIC STABILITY: FOOD INSECURITY: WITHIN THE PAST 12 MONTHS, THE FOOD YOU BOUGHT JUST DIDN'T LAST AND YOU DIDN'T HAVE MONEY TO GET MORE.: NEVER TRUE

## 2025-03-06 SDOH — ECONOMIC STABILITY: FOOD INSECURITY: WITHIN THE PAST 12 MONTHS, YOU WORRIED THAT YOUR FOOD WOULD RUN OUT BEFORE YOU GOT MONEY TO BUY MORE.: NEVER TRUE

## 2025-03-06 ASSESSMENT — PATIENT HEALTH QUESTIONNAIRE - PHQ9
SUM OF ALL RESPONSES TO PHQ QUESTIONS 1-9: 0
2. FEELING DOWN, DEPRESSED OR HOPELESS: NOT AT ALL
SUM OF ALL RESPONSES TO PHQ QUESTIONS 1-9: 0
1. LITTLE INTEREST OR PLEASURE IN DOING THINGS: NOT AT ALL
SUM OF ALL RESPONSES TO PHQ QUESTIONS 1-9: 0
SUM OF ALL RESPONSES TO PHQ QUESTIONS 1-9: 0

## 2025-03-06 ASSESSMENT — ENCOUNTER SYMPTOMS
CONSTIPATION: 0
ABDOMINAL PAIN: 0
ABDOMINAL DISTENTION: 0
COUGH: 0
SHORTNESS OF BREATH: 0
CHEST TIGHTNESS: 0

## 2025-03-06 NOTE — PROGRESS NOTES
08/05/2024    HCT 46.8 (H) 08/05/2024    MCV 91.1 08/05/2024     08/05/2024             This document was generated with the aid of voice recognition software.. Please be aware that there may be inadvertent transcription errors not identified and corrected by the author

## 2025-03-12 NOTE — PROGRESS NOTES
Name: Deja Busby  YOB: 1985  Gender: female  MRN: [id number]      CC: Knee Pain (B)       HPI: Deja Busby is a 39 y.o. female who returns for follow up on bilateral knees requesting repeat injections        Physical Examination:  General: no acute distress  Lungs: breathing easily  CV: regular rhythm by pulse  Right Knee and Left Knee: Bilateral knee exam unchanged diffusely tenderness to palpation.        Assessment:     ICD-10-CM    1. Bilateral primary osteoarthritis of knee  M17.0 triamcinolone acetonide (KENALOG-40) injection 40 mg     triamcinolone acetonide (KENALOG-40) injection 40 mg     DRAIN/INJECT LARGE JOINT/BURSA     DRAIN/INJECT LARGE JOINT/BURSA          Plan:   Before beginning this procedure, the procedure was explained in detail. The patient's name and  were confirmed. The body part and laterality were identified and agreed upon by the patient and myself.  The patient elected to proceed with an intraarticular knee injection today.  After verbal informed consent was obtained after sterile prep the Bilateral knee  was injected from a anterior lateral approach with 4 cc of 0.25% Marcaine and 1 cc of 40 mg Kenalog.  The patient tolerated the procedure well was given postinjection flare precautions.             Peng Eagle MD, FAAOS  Orthopaedics and Sports Medicine

## 2025-03-19 ENCOUNTER — PATIENT MESSAGE (OUTPATIENT)
Dept: INTERNAL MEDICINE CLINIC | Facility: CLINIC | Age: 40
End: 2025-03-19

## 2025-03-24 ENCOUNTER — TELEMEDICINE (OUTPATIENT)
Dept: INTERNAL MEDICINE CLINIC | Facility: CLINIC | Age: 40
End: 2025-03-24
Payer: COMMERCIAL

## 2025-03-24 DIAGNOSIS — E66.01 MORBID OBESITY WITH BMI OF 60.0-69.9, ADULT: Primary | ICD-10-CM

## 2025-03-24 DIAGNOSIS — G25.81 RESTLESS LEG SYNDROME: ICD-10-CM

## 2025-03-24 PROCEDURE — 99214 OFFICE O/P EST MOD 30 MIN: CPT | Performed by: INTERNAL MEDICINE

## 2025-03-24 RX ORDER — ROPINIROLE 0.5 MG/1
0.5 TABLET, FILM COATED ORAL 3 TIMES DAILY
Qty: 90 TABLET | Refills: 3 | Status: SHIPPED | OUTPATIENT
Start: 2025-03-24 | End: 2025-03-24

## 2025-03-24 RX ORDER — ROPINIROLE 0.5 MG/1
0.5 TABLET, FILM COATED ORAL
Qty: 90 TABLET | Refills: 1 | Status: SHIPPED | OUTPATIENT
Start: 2025-03-24

## 2025-03-24 NOTE — PROGRESS NOTES
Deja Busby, was evaluated through a synchronous (real-time) audio-video encounter. The patient (or guardian if applicable) is aware that this is a billable service, which includes applicable co-pays. This Virtual Visit was conducted with patient's (and/or legal guardian's) consent. Patient identification was verified, and a caregiver was present when appropriate.   The patient was located at Other: parking lot from work  Provider was located at Facility (Appt Dept): [address] [address]  Confirm you are appropriately licensed, registered, or certified to deliver care in the state where the patient is located as indicated above. If you are not or unsure, please re-schedule the visit: Yes, I confirm.     Deja Busby (:  1985) is a Established patient, presenting virtually for evaluation of the following:      Below is the assessment and plan developed based on review of pertinent history, physical exam, labs, studies, and medications.     Assessment & Plan  Morbid obesity with BMI of 60.0-69.9, adult            Restless leg syndrome       Orders:    rOPINIRole (REQUIP) 0.5 MG tablet; Take 1 tablet by mouth nightly    Patient was encouraged to continue with exercise routine, she also has been looking for weight management programs like weight watchers and [employer], but unfortunatel she cannot afford.  She will continue with exercise routine, dietary changes, discontinue Mounjaro,    We will restart therapy with Requip half tablet to 1 tablet at night to help with restless leg syndrome, patient will follow-up as scheduled in , will continue to follow-up with Ortho for bilateral knee pain.    Return if symptoms worsen or fail to improve, for as planned.       Subjective   Today she is requesting a medication for possible restless leg syndrome, she noticed that her legs have been more restless especially at night, now that she is walking more, unfortunately her insurance

## 2025-03-24 NOTE — ASSESSMENT & PLAN NOTE
Orders:    rOPINIRole (REQUIP) 0.5 MG tablet; Take 1 tablet by mouth nightly    Patient was encouraged to continue with exercise routine, she also has been looking for weight management programs like weight watchers and [employer], but unfortunatel she cannot afford.  She will continue with exercise routine, dietary changes, discontinue Mounjaro,    We will restart therapy with Requip half tablet to 1 tablet at night to help with restless leg syndrome, patient will follow-up as scheduled in June, will continue to follow-up with Ortho for bilateral knee pain.

## 2025-06-18 DIAGNOSIS — E66.01 MORBID OBESITY WITH BMI OF 60.0-69.9, ADULT (HCC): ICD-10-CM

## 2025-06-18 RX ORDER — METFORMIN HYDROCHLORIDE 500 MG/1
1000 TABLET, EXTENDED RELEASE ORAL
Qty: 180 TABLET | Refills: 1 | Status: SHIPPED | OUTPATIENT
Start: 2025-06-18

## 2025-06-19 DIAGNOSIS — G25.81 RESTLESS LEG SYNDROME: ICD-10-CM

## 2025-06-19 RX ORDER — ROPINIROLE 0.5 MG/1
0.5 TABLET, FILM COATED ORAL 3 TIMES DAILY
Qty: 270 TABLET | Refills: 1 | Status: SHIPPED | OUTPATIENT
Start: 2025-06-19

## 2025-06-19 NOTE — TELEPHONE ENCOUNTER
Requested Prescriptions     Pending Prescriptions Disp Refills    rOPINIRole (REQUIP) 0.5 MG tablet [Pharmacy Med Name: ROPINIROLE HCL 0.5 MG TABLET] 270 tablet 1     Sig: TAKE 1 TABLET BY MOUTH THREE TIMES A DAY      Next ov 06/23/2025. Pharmacy confirmed.

## 2025-06-23 ENCOUNTER — OFFICE VISIT (OUTPATIENT)
Dept: INTERNAL MEDICINE CLINIC | Facility: CLINIC | Age: 40
End: 2025-06-23
Payer: COMMERCIAL

## 2025-06-23 VITALS
DIASTOLIC BLOOD PRESSURE: 76 MMHG | HEIGHT: 63 IN | BODY MASS INDEX: 51.91 KG/M2 | OXYGEN SATURATION: 97 % | HEART RATE: 59 BPM | WEIGHT: 293 LBS | SYSTOLIC BLOOD PRESSURE: 122 MMHG

## 2025-06-23 DIAGNOSIS — Z12.31 SCREENING MAMMOGRAM FOR BREAST CANCER: ICD-10-CM

## 2025-06-23 DIAGNOSIS — E66.01 MORBID OBESITY WITH BMI OF 60.0-69.9, ADULT (HCC): Primary | ICD-10-CM

## 2025-06-23 DIAGNOSIS — F98.8 ADULT ATTENTION DEFICIT DISORDER: ICD-10-CM

## 2025-06-23 DIAGNOSIS — G25.81 RESTLESS LEG SYNDROME: ICD-10-CM

## 2025-06-23 PROCEDURE — 99214 OFFICE O/P EST MOD 30 MIN: CPT | Performed by: INTERNAL MEDICINE

## 2025-06-23 RX ORDER — ROPINIROLE 2 MG/1
2 TABLET, FILM COATED ORAL
Qty: 90 TABLET | Refills: 1 | Status: SHIPPED | OUTPATIENT
Start: 2025-06-23

## 2025-06-23 ASSESSMENT — ENCOUNTER SYMPTOMS
CHEST TIGHTNESS: 0
ABDOMINAL DISTENTION: 0
ABDOMINAL PAIN: 0
CONSTIPATION: 0
SHORTNESS OF BREATH: 0
COUGH: 0

## 2025-06-23 NOTE — PROGRESS NOTES
Physical exam:    /76 (BP Site: Left Upper Arm, Patient Position: Sitting)   Pulse 59   Ht 1.6 m (5' 3\")   Wt (!) 169.6 kg (374 lb)   SpO2 97%   BMI 66.25 kg/m²     Physical Exam  Vitals reviewed.   Constitutional:       Appearance: Normal appearance.   HENT:      Head: Normocephalic and atraumatic.   Cardiovascular:      Rate and Rhythm: Normal rate and regular rhythm.   Pulmonary:      Effort: Pulmonary effort is normal.      Breath sounds: Normal breath sounds.   Neurological:      General: No focal deficit present.      Mental Status: She is alert and oriented to person, place, and time.   Psychiatric:         Mood and Affect: Mood normal.         Behavior: Behavior normal.          Recent labs:    Hemoglobin A1C   Date Value Ref Range Status   08/05/2024 5.2 0 - 5.6 % Final     Comment:     Reference Range  Normal       <5.7%  Prediabetes  5.7-6.4%  Diabetes     >6.4%          Lab Results   Component Value Date    CHOL 178 08/05/2024    CHOL 189 08/01/2023    CHOL 180 09/14/2022     Lab Results   Component Value Date    TRIG 87 08/05/2024    TRIG 158 (H) 08/01/2023    TRIG 111 09/14/2022     Lab Results   Component Value Date    HDL 65 (H) 08/05/2024    HDL 66 (H) 08/01/2023    HDL 67 (H) 09/14/2022     No components found for: \"LDLCHOLESTEROL\", \"LDLCALC\"  Lab Results   Component Value Date    VLDL 17 08/05/2024    VLDL 31.6 (H) 08/01/2023    VLDL 22.2 09/14/2022     Lab Results   Component Value Date    CHOLHDLRATIO 2.8 08/05/2024    CHOLHDLRATIO 2.9 08/01/2023    CHOLHDLRATIO 2.7 09/14/2022     Lab Results   Component Value Date    TSH 1.600 09/14/2022    TSHELE 1.15 08/05/2024         Lab Results   Component Value Date     08/05/2024    K 4.2 08/05/2024     08/05/2024    CO2 25 08/05/2024    BUN 7 08/05/2024    CREATININE 0.79 08/05/2024    GLUCOSE 85 08/05/2024    CALCIUM 9.8 08/05/2024    BILITOT 0.5 08/05/2024    ALKPHOS 49 08/05/2024    AST 34 08/05/2024    ALT 35 08/05/2024

## 2025-08-04 ASSESSMENT — PATIENT HEALTH QUESTIONNAIRE - PHQ9
5. POOR APPETITE OR OVEREATING: SEVERAL DAYS
6. FEELING BAD ABOUT YOURSELF - OR THAT YOU ARE A FAILURE OR HAVE LET YOURSELF OR YOUR FAMILY DOWN: SEVERAL DAYS
9. THOUGHTS THAT YOU WOULD BE BETTER OFF DEAD, OR OF HURTING YOURSELF: NOT AT ALL
4. FEELING TIRED OR HAVING LITTLE ENERGY: NOT AT ALL
1. LITTLE INTEREST OR PLEASURE IN DOING THINGS: NOT AT ALL
4. FEELING TIRED OR HAVING LITTLE ENERGY: NOT AT ALL
1. LITTLE INTEREST OR PLEASURE IN DOING THINGS: NOT AT ALL
SUM OF ALL RESPONSES TO PHQ QUESTIONS 1-9: 5
2. FEELING DOWN, DEPRESSED OR HOPELESS: NOT AT ALL
SUM OF ALL RESPONSES TO PHQ QUESTIONS 1-9: 5
6. FEELING BAD ABOUT YOURSELF - OR THAT YOU ARE A FAILURE OR HAVE LET YOURSELF OR YOUR FAMILY DOWN: SEVERAL DAYS
SUM OF ALL RESPONSES TO PHQ QUESTIONS 1-9: 5
2. FEELING DOWN, DEPRESSED OR HOPELESS: NOT AT ALL
SUM OF ALL RESPONSES TO PHQ QUESTIONS 1-9: 5
SUM OF ALL RESPONSES TO PHQ QUESTIONS 1-9: 5
10. IF YOU CHECKED OFF ANY PROBLEMS, HOW DIFFICULT HAVE THESE PROBLEMS MADE IT FOR YOU TO DO YOUR WORK, TAKE CARE OF THINGS AT HOME, OR GET ALONG WITH OTHER PEOPLE: NOT DIFFICULT AT ALL
7. TROUBLE CONCENTRATING ON THINGS, SUCH AS READING THE NEWSPAPER OR WATCHING TELEVISION: SEVERAL DAYS
8. MOVING OR SPEAKING SO SLOWLY THAT OTHER PEOPLE COULD HAVE NOTICED. OR THE OPPOSITE, BEING SO FIGETY OR RESTLESS THAT YOU HAVE BEEN MOVING AROUND A LOT MORE THAN USUAL: NOT AT ALL
10. IF YOU CHECKED OFF ANY PROBLEMS, HOW DIFFICULT HAVE THESE PROBLEMS MADE IT FOR YOU TO DO YOUR WORK, TAKE CARE OF THINGS AT HOME, OR GET ALONG WITH OTHER PEOPLE: NOT DIFFICULT AT ALL
3. TROUBLE FALLING OR STAYING ASLEEP: MORE THAN HALF THE DAYS
9. THOUGHTS THAT YOU WOULD BE BETTER OFF DEAD, OR OF HURTING YOURSELF: NOT AT ALL
5. POOR APPETITE OR OVEREATING: SEVERAL DAYS
3. TROUBLE FALLING OR STAYING ASLEEP: MORE THAN HALF THE DAYS
7. TROUBLE CONCENTRATING ON THINGS, SUCH AS READING THE NEWSPAPER OR WATCHING TELEVISION: SEVERAL DAYS
8. MOVING OR SPEAKING SO SLOWLY THAT OTHER PEOPLE COULD HAVE NOTICED. OR THE OPPOSITE - BEING SO FIDGETY OR RESTLESS THAT YOU HAVE BEEN MOVING AROUND A LOT MORE THAN USUAL: NOT AT ALL

## 2025-08-04 ASSESSMENT — ANXIETY QUESTIONNAIRES
GAD7 TOTAL SCORE: 15
5. BEING SO RESTLESS THAT IT IS HARD TO SIT STILL: MORE THAN HALF THE DAYS
6. BECOMING EASILY ANNOYED OR IRRITABLE: SEVERAL DAYS
IF YOU CHECKED OFF ANY PROBLEMS ON THIS QUESTIONNAIRE, HOW DIFFICULT HAVE THESE PROBLEMS MADE IT FOR YOU TO DO YOUR WORK, TAKE CARE OF THINGS AT HOME, OR GET ALONG WITH OTHER PEOPLE: SOMEWHAT DIFFICULT
3. WORRYING TOO MUCH ABOUT DIFFERENT THINGS: MORE THAN HALF THE DAYS
2. NOT BEING ABLE TO STOP OR CONTROL WORRYING: NEARLY EVERY DAY
3. WORRYING TOO MUCH ABOUT DIFFERENT THINGS: MORE THAN HALF THE DAYS
7. FEELING AFRAID AS IF SOMETHING AWFUL MIGHT HAPPEN: MORE THAN HALF THE DAYS
IF YOU CHECKED OFF ANY PROBLEMS ON THIS QUESTIONNAIRE, HOW DIFFICULT HAVE THESE PROBLEMS MADE IT FOR YOU TO DO YOUR WORK, TAKE CARE OF THINGS AT HOME, OR GET ALONG WITH OTHER PEOPLE: SOMEWHAT DIFFICULT
2. NOT BEING ABLE TO STOP OR CONTROL WORRYING: NEARLY EVERY DAY
6. BECOMING EASILY ANNOYED OR IRRITABLE: SEVERAL DAYS
1. FEELING NERVOUS, ANXIOUS, OR ON EDGE: NEARLY EVERY DAY
4. TROUBLE RELAXING: MORE THAN HALF THE DAYS
7. FEELING AFRAID AS IF SOMETHING AWFUL MIGHT HAPPEN: MORE THAN HALF THE DAYS
4. TROUBLE RELAXING: MORE THAN HALF THE DAYS
1. FEELING NERVOUS, ANXIOUS, OR ON EDGE: NEARLY EVERY DAY
5. BEING SO RESTLESS THAT IT IS HARD TO SIT STILL: MORE THAN HALF THE DAYS

## 2025-08-04 ASSESSMENT — LIFESTYLE VARIABLES
ALCOHOL_DAYS_PER_WEEK: 0
HISTORY_ALCOHOL_USE: NO
PAST THREE MONTHS WHAT IS THE LARGEST AMOUNT OF ALCOHOLIC DRINKS YOU HAVE CONSUMED IN ONE DAY: 2
HAVE YOU EVER RECEIVED ALCOHOL OR OTHER DRUG ABUSE TREATMENT: NO

## 2025-08-05 ENCOUNTER — OFFICE VISIT (OUTPATIENT)
Dept: BEHAVIORAL/MENTAL HEALTH CLINIC | Facility: CLINIC | Age: 40
End: 2025-08-05
Payer: COMMERCIAL

## 2025-08-05 VITALS
SYSTOLIC BLOOD PRESSURE: 106 MMHG | HEIGHT: 63 IN | BODY MASS INDEX: 51.91 KG/M2 | WEIGHT: 293 LBS | DIASTOLIC BLOOD PRESSURE: 78 MMHG | HEART RATE: 60 BPM | OXYGEN SATURATION: 99 %

## 2025-08-05 DIAGNOSIS — F43.22 ADJUSTMENT DISORDER WITH ANXIOUS MOOD: Primary | ICD-10-CM

## 2025-08-05 DIAGNOSIS — R41.840 ATTENTION AND CONCENTRATION DEFICIT: ICD-10-CM

## 2025-08-05 PROCEDURE — 90792 PSYCH DIAG EVAL W/MED SRVCS: CPT | Performed by: PSYCHIATRY & NEUROLOGY

## 2025-08-05 RX ORDER — ATOMOXETINE 40 MG/1
40 CAPSULE ORAL DAILY
Qty: 30 CAPSULE | Refills: 1 | Status: SHIPPED | OUTPATIENT
Start: 2025-08-05

## 2025-08-11 ENCOUNTER — OFFICE VISIT (OUTPATIENT)
Dept: ORTHOPEDIC SURGERY | Age: 40
End: 2025-08-11
Payer: COMMERCIAL

## 2025-08-11 DIAGNOSIS — M17.0 BILATERAL PRIMARY OSTEOARTHRITIS OF KNEE: Primary | ICD-10-CM

## 2025-08-11 PROCEDURE — 99214 OFFICE O/P EST MOD 30 MIN: CPT | Performed by: PHYSICIAN ASSISTANT

## 2025-08-11 PROCEDURE — 20610 DRAIN/INJ JOINT/BURSA W/O US: CPT | Performed by: PHYSICIAN ASSISTANT

## 2025-08-11 RX ORDER — TRIAMCINOLONE ACETONIDE 40 MG/ML
80 INJECTION, SUSPENSION INTRA-ARTICULAR; INTRAMUSCULAR ONCE
Status: COMPLETED | OUTPATIENT
Start: 2025-08-11 | End: 2025-08-11

## 2025-08-11 RX ADMIN — TRIAMCINOLONE ACETONIDE 80 MG: 40 INJECTION, SUSPENSION INTRA-ARTICULAR; INTRAMUSCULAR at 13:39
